# Patient Record
(demographics unavailable — no encounter records)

---

## 2019-10-10 NOTE — DIAGNOSTIC IMAGING REPORT
Abdomen, 1 view.





History: Kidney stones.



Comparison: 4/25/2019.



Findings: Air is scattered throughout nondilated small and large bowel. There

are no masses. The previously seen small calcification projected over the right

renal lower pole is not seen on today's exam. The osseous structures are

intact.





IMPRESSION:

Non-specific bowel gas pattern.



Signed by: Andrey Barone on 10/10/2019 10:10 AM

## 2020-05-01 NOTE — DIAGNOSTIC IMAGING REPORT
EXAM: Renal Ultrasound

INDICATION:        

^[id number]

^0823

^NEOPLASM OF UNCERTAIN OF UNSPC'D KIDNEY  

COMPARISON: CT abdomen and pelvis of 3/11/2019 

TECHNIQUE: Transverse and longitudinal images of the kidneys and bladder were

obtained.  



FINDINGS:     



Right Kidney: 

Length: 12.7 cm

Appearance: Normal echogenicity.  

Collecting system: No hydronephrosis

Stones: None

Cyst/Mass: None



Left Kidney: 

Length: 12.3 cm

Appearance: Normal echogenicity.  

Collecting system: No hydronephrosis

Stones: None

Cyst/Mass: Upper pole 1.4 cm anechoic simple cyst.



Bladder: 

No mass or calculi. Bilateral ureteral jets visualized. Prevoid volume estimate

of 348 cc.



Prostate volume estimate of 40.2 cc.



IMPRESSION:

Left upper pole simple cyst.



No hydronephrosis or renal calculi.



Signed by: Braden Ferrera MD on 5/1/2020 10:08 AM

## 2020-08-18 NOTE — XMS REPORT
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    

                             1,000 mL, 

Rate: 75 ml/hr, Infuse over: 13.3 hr, Route: IV, Dosing Weight 88.636 kg, Total 
Volume: 1,000, Start date: 18 18:26:00 CDT, Duration: 30 day, Stop date: 
18 18:25:00 CDT, 2.04, m2                                         [facility]

 

     Amarayl Amarayl           Yes            2         Daily           [facility]

 

     Avodart Avodart           Yes            5         Daily           [facility]

 

                          Fenofibrate Nanocrystallized (Fenofibrate) 145 Mg Tabl

et Fenofibrate 

Nanocrystallized (Fenofibrate) 145 Mg Tablet             Yes               145  

       Daily             [facility]

 

                          Lansoprazole (Prevacid Solutab) 30 Mg Tabdp Lansoprazo

le (Prevacid Solutab) 30 

Mg Tabdp             Yes               30          Daily             [facility]

 

     Lisinopril Lisinopril           Yes            5         Daily           [facility]

 

       Metformin Hcl 500 Mg Tablet Metformin Hcl 500 Mg Tablet               Yes

                  500           Twice 

A Day                                                       [facility]

 

       Minocycline Hcl 50 Mg Capsule Minocycline Hcl 50 Mg Capsule              

 Yes                  100           

Daily                                                       [facility]

 

     Neurontin Neurontin           Yes            600       Daily           [facility]

 

                          Solifenacin Succinate (Vesicare) 5 Mg Tablet Solifenac

in Succinate (Vesicare) 5 

Mg Tablet             Yes               5           Daily             [facility]

 

     Synthroid Synthroid           Yes            125       Daily           [facility]

 

        Tamsulosin Hcl 0.4 Mg Cap.er.24h Tamsulosin Hcl 0.4 Mg Cap.er.24h       

          Yes                     .4

                          Daily                                  [facility]

 

     Levitra , Levitra ,      2015 00:00:00 No                       Daily

           [facility]

 

     Metformin , Metformin ,      2015 00:00:00 No                       D

aily           [facility]

 

     Tricor , Tricor ,      2015 00:00:00 No                       Daily  

         [facility]







Vital Signs





             Vital Name   Observation Time Observation Value Comments     Source

 

             Systolic (mm Hg) 2020 19:21:00                           [facility]

 

             Diastolic (mm Hg) 2020 19:21:00                           [facility]

[facility] [place]

 

             Heart Rate   2020 19:21:00                           [facility]

 

             Temperature Oral (F) 2020 19:21:00 98.1 F                    

[facility]

 

             Height       2020 19:21:00 165.1 cm                  [facility]

 

             Weight       2020 19:21:00                           [facility]

 

             BMI Calculated 2020 19:21:00                           [facility]

 

             Systolic (mm Hg) 2019 17:05:00                           [facility] [place]

 

             Diastolic (mm Hg) 2019 17:05:00                           [facility]

[facility]

 

             Heart Rate   2019 17:05:00                           [facility]

 

             Temperature Oral (F) 2019 17:05:00 98.0 F                    

[facility] [place]

 

             Height       2019 17:05:00 165.1 cm                  [facility]

 [place]

 

             Weight       2019 17:05:00                           [facility]

 [place]

 

             BMI Calculated 2019 17:05:00                           [facility]

[facility]

 

             Systolic blood pressure 2019 14:52:00 170 mm[Hg]             

   [facility]

 

             Diastolic blood pressure 2019 14:52:00 84 mm[Hg]             

    [facility]

 

             Heart rate   2019 14:52:00 70 /min                   [facility] 

[facility]

 

             Respiratory rate 2019 14:52:00 16 /min                   [facility]

 

                    Oxygen saturation in Arterial blood by Pulse oximetry  14:52:00 97 

/min                                                [facility]

 

             Body temperature 2019 13:35:00 36.39 Miranda                 [facility]

 

             Body height  2019 12:00:00 165.1 cm                  [facility]

 

             Body weight  2019 12:00:00 81.149 kg                 [facility]

 

             BMI          2019 12:00:00 29.77 kg/m2               [facility] 

[facility]

 

             Weight       2018-03-15 14:00:00                           [facility]

 

             Systolic (mm Hg) 2018-03-15 13:00:00                           [facility]

[facility]

 

             Diastolic (mm Hg) 2018-03-15 13:00:00                           [facility]

[facility]

 

             Temperature Oral (F) 2018-03-15 13:00:00 98.2 F                    

[facility]

 

             Heart Rate   2018-03-15 13:00:00                           [facility]

 [place]

 

             Respitory Rate 2018-03-15 13:00:00                           [facility]

[facility] [place]

 

             Heart Rate   2018-03-15 08:50:00                           [facility]

 

             Respitory Rate 2018-03-15 08:50:00                           [facility]

[facility]

 

             Systolic (mm Hg) 2018-03-15 08:50:00                           [facility]

[facility]

 

             Diastolic (mm Hg) 2018-03-15 08:50:00                           [facility]

[facility] [place]

 

             Temperature Oral (F) 2018-03-15 08:50:00 98.0 F                    

[facility]

 

             Temperature Oral (F) 2018-03-15 03:47:00 97.6 F                    

[facility]

 

             Respitory Rate 2018-03-15 03:47:00                           [facility]

[facility] [place]

 

             Heart Rate   2018-03-15 03:47:00                           [facility]

 [place]

 

             Systolic (mm Hg) 2018-03-15 03:47:00                           [facility]

 

             Diastolic (mm Hg) 2018-03-15 03:47:00                           [facility]

[facility] [place]

 

             BMI Calculated 2018-03-15 03:40:00                           [facility]

[facility]

 

             Weight       2018-03-15 03:40:00                           [facility]

 [place]

 

             Height       2018-03-15 03:40:00 165.1 cm                  [facility]

 [place]

 

             Weight       2018 19:35:00                           [facility]

 [facility]

 

             BMI Calculated 2018 19:35:00                           [facility]

[facility]

 

             Height       2018 19:35:00 165.1 cm                  [facility]







Procedures





                Procedure       Date / Time Performed Performing Clinician [facility]

michelle

 

                    EXTRACORPOREAL SHOCKWAVE LITHOTRIPSY (ESWL) 2019 12:47

:00 Mandeep White

 

                POC GLUCOSE     2019 12:08:00 Mandeep White

[facility]

 

                XR ABDOMEN 1 VW 2019 11:41:25 Mandeep White

[facility]

 

                ECG PRE/POST OP 2019 10:47:18 Mandeep White

[facility]

 

                CBC HEMOGRAM    2019 10:44:00 Mandeep White

ethlilian

 

                COMPREHENSIVE METABOLIC PANEL 2019 10:44:00 Dread White

 

                ESTIMATED GFR   2019 10:44:00 Mandeep White

[facility]

 

                    Computed tomography of brain without radiopaque contrast 201

14 00:00:00 

ANDREY OTNIVEROS                        [facility]

 

                CT maxillofacial area wo contrast 2018 00:00:00 SANCHEZ ONTIVEROS [facility]

 

                    Computed tomography of cervical spine without contrast  00:00:00 

ANDREY ONTIVEROS                        [facility]







Plan of Care





             Planned Activity Planned Date Details      Comments     Source

 

                    Future Scheduled Test 2020 00:00:00 INFLUENZA VACCINE 

(#1) [code = 

INFLUENZA VACCINE (#1)]                             [facility]

 

                    Future Scheduled Test 2020 00:00:00 INFLUENZA VACCINE 

[code = INFLUENZA 

VACCINE]                                            [facility] Scheduled Test 2012-11-10 00:00:00 PNEUMOCOCCAL 65+ L

OW/MEDIUM RISK (1 of

2 - PCV13) [code = PNEUMOCOCCAL 65+ LOW/MEDIUM RISK (1 of 2 - PCV13)]           

                [facility]

 

                    Future Scheduled Test 2012-11-10 00:00:00 65+ PNEUMOCOCCAL V

ACCINE (2 of 2 - 

PPSV23) [code = 65+ PNEUMOCOCCAL VACCINE (2 of 2 - PPSV23)]                     

      [facility] Scheduled Test 1997-11-10 00:00:00 COLONOSCOPY SCREEN

ING [code = 

COLONOSCOPY SCREENING]                              [facility] Scheduled Test 1997-11-10 00:00:00 SHINGLES VACCINES 

(#1) [code = 

SHINGLES VACCINES (#1)]                             [facility] Scheduled Test 1947 00:00:00 Screening for jed

gnant neoplasm of 

colon (procedure) [code = 444786187]                           [facility]







Encounters





             Start Date/Time End Date/Time Encounter Type Admission Type Attendi

[facility]   Care Department Encounter ID    Source

 

        2020 14:33:56         Outpatient                 [facility]    [id number]

[id number]    [facility]

 

           2020 08:30:00 2020 08:30:00 Outpatient            Warren Chandrakami [facility]                [id number]         

 

           2020 13:00:00 2020 23:59:59 Outpatient            Warren Chandrakami [facility]                [id number]         

 

           2020 05:58:00 2020 23:59:00 Outpatient            Warren Chandra [facility]                [id number]         

 

        2020-01-10 14:07:48 2020 23:59:59 Outpatient                 [facility]    [id number]  

 

           2019 09:45:00 2019 23:59:59 Outpatient            Warren Chandrakami [facility]                [id number]         

 

        2018 10:04:00 2018 23:59:59 Outpatient                 ANDIE HODGE 

[id number]                             

 

           2018 11:02:00 2018 23:59:00 Outpatient            Myles Pelletier [facility]              [id number]         

 

           2018 12:00:00 2018 12:00:00 Outpatient            [facility] 

[facility] 

[facility]           [facility]           [id number]         

 

          2018 14:34:00 2018-03-15 10:25:00 Outpatient           Myles Murphy [facility]                     [id number]               

 

             2018 09:31:00 2018 13:37:00 Departed Emergency Room ER 

          LOVELY DEY           [facility]          [id number]    [facility]

 

           2018 09:17:00 2018 09:17:00 Registered Clinic [facility]              [id number]        [facility]

 

           2017 08:29:00 2017 08:29:00 Registered Clinic [facility]              [id number]        [facility]

 

           2017 08:14:00 2017 08:14:00 Registered Clinic [facility]              [id number]        [facility]

 

        2017 07:54:00 2017 07:54:00 Registered Clinic               

  [facility]  

[id number]                            [facility]







Results





           Test Description Test Time  Test Comments Results    Result Comments 

Source

 

                CHEST SINGLE (PORTABLE) 2020 16:12:00                     

                              

                                                   [place] [address]      Patient Name: SELVIN PATEL                                MR 
#: [id number]                  : 1947                                  
Age/Sex: 72/M  Acct #: [id number]                              Req #: [id number]
[id number]  Adm Physician:                                                      
Ordered by: Romulo Soriano MD                         Report #: [id number]  
     Location: ER                                      Room/Bed:                
  
________________________________________________________________________________

___________________    Procedure: [id number] DX/CHEST SINGLE (PORTABLE)  Exam 
Date:                             Exam Time:                                    
          REPORT STATUS: Signed    EXAMINATION:  CHEST SINGLE (PORTABLE)        
 INDICATION: Fever      COMPARISON: Chest radiograph 3/5/2019           
FINDINGS:      LINES/TUBES:None      LUNGS:The lungs are well-inflated. No focal
consolidation or pulmonary edema.      PLEURA:No pleural effusion or pneumo
thorax.      MEDIASTINUM:The cardiomediastinal silhouette appears normal in size
and shape.      BONES/SOFT TISSUES:No acute osseous injury. Right proximal 
humerus anchors.      ABDOMEN:No free air under the diaphragm.         
IMPRESSION:    No focal pneumonia or pulmonary edema.      Signed by: Mg Billingsley MD on 2020 4:13 PM        Dictated By: MG BILLINGSLEY MD  Electronically 
Signed By: MG BILLINGSLEY MD on 20  Transcribed By: VALDEMAR on 20 
1613       COPY TO:   ROMULO SORIANO MD                                     

 

                 RENAL RETROPERITONEAL COMP 2020 10:06:00               

                              

                                                         [place] [address]      Patient Name: SELVIN PATEL                          
     MR #: [id number]                  : 1947                          
        Age/Sex: 72/M  Acct #: [id number]                              Req #: 
[id number]  Adm Physician:                                                      
Ordered by: MANDEEP WHITE MD                         Report #: [id number]      
 Location:                                       Room/Bed:                   
________________________________________________________________________________

___________________    Procedure: [id number] US/US RENAL RETROPERITONEAL COMP  
Exam Date: 20                            Exam Time: 823                  
                           REPORT STATUS: Signed    EXAM: Renal Ultrasound   
INDICATION:            2020    NEOPLASM OF UNCERTAIN OF UNSPC'D 
KIDNEY     COMPARISON: CT abdomen and pelvis of 3/11/2019    TECHNIQUE: 
Transverse and longitudinal images of the kidneys and bladder were   obtained.  
     FINDINGS:           Right Kidney:    Length: 12.7 cm   Appearance: Normal 
echogenicity.     Collecting system: No hydronephrosis   Stones: None   
Cyst/Mass: None      Left Kidney:    Length: 12.3 cm   Appearance: Normal 
echogenicity.     Collecting system: No hydronephrosis   Stones: None   
Cyst/Mass: Upper pole 1.4 cm anechoic simple cyst.      Bladder:    No mass or 
calculi. Bilateral ureteral jets visualized. Prevoid volume estimate   of 348 
cc.      Prostate volume estimate of 40.2 cc.      IMPRESSION:   Left upper pole
simple cyst.      No hydronephrosis or renal calculi.      Signed by: Mg Billingsley MD on 2020 10:08 AM        Dictated By: MG BILLINGSLEY MD  Electronically 
Signed By: MG BILLINGSLEY MD on 20 1008  Transcribed By: VALDEMAR on 20 
1008       COPY TO:   MANDEEP WHITE MD                                     

 

           CHEM PANEL 2020 14:19:00            0.9                   [facility]

 

           CHEM PANEL 2020 14:19:00            85                    [facility]

 

                ABDOMEN-1VIEW (KUB) 2019-10-10 10:08:00                         

                              

                                               [place] [address]
     Patient Name: SELVIN PATEL                                   MR #: 
[id number]                     : 1947                                  
Age/Sex: 71/M  Acct #: [id number]                              Req #: [id number]
[id number]  Adm Physician:                                                      
Ordered by: MANDEEP WHITE MD                            Report #: [id number]   
    Location: [facility]                                     Room/Bed:                 
   
________________________________________________________________________________

___________________    Procedure: [id number] DX/ABDOMEN-1VIEW (KUB)  Exam Date: 
10/10/19                            Exam Time: 0910                             
                REPORT STATUS: Signed       Abdomen, 1 view.         History: 
Kidney stones.      Comparison: 2019.      Findings: Air is scattered 
throughout nondilated small and large bowel. There   are no masses. The 
previously seen small calcification projected over the right   renal lower pole 
is not seen on today's exam. The osseous structures are   intact.         
IMPRESSION:   Non-specific bowel gas pattern.      Signed by: Andrey Barone on 
10/10/2019 10:10 AM        Dictated By: ANDREY BARONE MD  Electronically Signed 
By: ANDREY BARONE MD on 10/10/19 1010  Transcribed By: VALDEMAR on 10/10/19 1010 
     COPY TO:   MANDEEP WHITE MD                                     

 

                    POC glucose         2019 12:15:21   

 

                                        Test Item

 

             POC glucose (test code = 62694-0) 110 mg/dL    65-99        H      

      Meter ID: 

[id number]: Radha Alaina 

 

             Lab Interpretation (test code = 93552-9) Abnormal                  

              





[facility] [id number] 11:50:22Hm Interface, Radiology 
Results  - 2019 11:53 AM CDTEXAMINATION:  XR ABDOMEN 1 VWCLINICAL 
HISTORY:  KUB for renal stone location preopCOMPARISON:  2019IMPRESSION:A 
couple of faint 3 mm calculi overlie the right lower renal pole.The bowel gas 
pattern is unremarkable.There is no significant skeletal abn
ormality.[id number] MethodistECG Pre/Post [id number] 12:12:30* 



             Test Item    Value        Reference Range Interpretation Comments

 

             Ventricular rate (test code = 253) 69                              

        

 

             Atrial rate (test code = 255) 69                                   

   

 

             VA interval (test code = 266) 172                                  

   

 

             QRSD interval (test code = 260) 110                                

     

 

             QT interval (test code = 264) 396                                  

   

 

             QTC interval (test code = 265) 424                                 

    

 

             P axis 1 (test code = 267) 10                                      

 

             QRS axis 1 (test code = 268) 42                                    

  

 

             T wave axis (test code = 270) 34                                   

   

 

                          EKG impression (test code = 273) Normal sinus rhythm-L

ow voltage QRS-Incomplete 

right bundle branch block-Borderline ECG-In automated comparison with ECG of 
2019 09:24,-No significant change was found-Electronically Signed By Carlos Vincent MD ([id number]) on 2019 12:12:27 PM                                   

       





[facility]Com[facility] metabolic [id number] 11:23:51* 



             Test Item    Value        Reference Range Interpretation Comments

 

             Sodium (test code = 2951-2) 138          135- 148 mEq/L            

   

 

             Potassium (test code = 2823-3) 4.2          3.5- 5.0 mEq/L         

      

 

             Chloride (test code = 5-0) 101          98- 112 mEq/L           

    

 

             CO2 (test code = -9) 27           24- 31 mEq/L               

 

             Anion gap (test code = 33037-3) 10@ANIO      7- 15 mEq/L           

     

 

             BUN (test code = 3094-0) 13 mg/dL     8-23                       

 

             Creatinine (test code = 2160-0) 1.00 mg/dL   0.7-1.2               

     

 

             Glucose (test code = 2345-7) 138 mg/dL    65-99        H           

  

 

             Calcium (test code = 46748-8) 9.7 mg/dL    8.8-10.2                

   

 

             Protein (test code = 2885-2) 7.4 g/dL     6.3-8.3                  

 [place]               4.6-7.0 

g/dL1 week               4.4-7.6 g/dL7 months-1year          5.1-7.3 g/dL1-2 
years          5.6-7.5 g/dL>3 years          6.0-8.0 g/dY69-431               
6.3-8.3 g/dL 

 

             Albumin (test code = 1751-7) 4.5 g/dL     3.5-5                    

  

 

             A/G ratio (test code = 1759-0) 1.6          0.7-3.8                

    

 

             Alkaline phosphatase (test code = 6768-6) 32 U/L             

 L             

 

             AST (test code = 1920-8) 19 U/L       10-50                      

 

             ALT (test code = 1742-6) 23 U/L       5-50                       

 

             Total bilirubin (test code = -2) <0.2         0-1.2            

          

 

             Lab Interpretation (test code = 97059-4) Abnormal                  

              





[facility]ammad MethodistEstimated [id number] 11:23:51* 



             Test Item    Value        Reference Range Interpretation Comments

 

             Estimated GFR (test code = 5488) 75           mL/min/1.73 m2       

       Catergory  Units    

InterpretationG1         >=90     Normal or highG2         60-89    Mildly 
hccmbbwimN9w        45-59    Mildly to moderately foeisxlyyR9u        30-44    
Moderately to severely decreasedG4         15-29    Severely decreasedG5        
<15      Kidney failureThe eGFR was calculated using the Chronic Kidney Disease 
Epidemiology Collaboration (CKD-EPI) equation. Interpretation is based on 
recommendations of the National Kidney Foundation-Kidney Disease Outcomes 
Quality Initiative (NKF-KDOQI) published in 2014. 





[facility] [id number] 11:16:08* 



             Test Item    Value        Reference Range Interpretation Comments

 

             WBC (test code = 36757-1) 5.44         4.50- 11.00 k/uL            

   

 

             RBC (test code = 20177-1) 5.36 m/uL    4.4-6                      

 

             HGB (test code = 718-7) 13.7 g/dL    14-18        L             

 

             HCT (test code = 4544-3) 44.3 %       41-51                      

 

             MCV (test code = 787-2) 82.6 fL                           

 

             MCH (test code = 785-6) 25.6 pg      27-34        L             

 

             MCHC (test code = 786-4) 30.9 g/dL    31-37        L             

 

             RDW - SD (test code = 12574-0) 43.8 fL      37-55                  

    

 

             MPV (test code = 19414-3) 10.6 fL      8.8-13.2                   

 

             Platelet count (test code = 96194-7) 213          150- 400 k/uL    

           

 

             Nucleated RBC (test code = 79371-2) 0.00         /100 WBC          

         

 

             Lab Interpretation (test code = 19924-1) Abnormal                  

              





[facility] [id number] 12:50:00* 



             Test Item    Value        Reference Range Interpretation Comments

 

             LACTIC ACID POC (test code = LACTP) 2.20 mmol/L  0.7-2.0      HH   

         





[id number] 11:01:00* 



             Test Item    Value        Reference Range Interpretation Comments

 

             POTASSIUM (test code = K) 5.0 mmol/L   3.4-5.0      N             





HGB   [id number] 10:53:00* 



             Test Item    Value        Reference Range Interpretation Comments

 

             HEMOGLOBIN (test code = HGB) 11.8 g/dL    14.0-18.0    L           

  

 

             HEMATOCRIT (test code = HCT) 37.1 %       37.0-49.0    N           

  





- CT ABD PELVIS [id number] 10:12:00 FAX:        Beulah Walton MD       
[phone]    [place]:   St: REG----------
---------------------------------------------------------------------  Name:  SELVIN SIBLEY                     [facility]                     : 11/10/194
7   Age/S: 71/M           [address]                  Unit: [id number]      
Loc: [place] [address]                Phys:  Beulah Walton MD     
                                              Acct:  [id number] Dis Date:     
         Status: REG ER                                  PHONE #: [phone]  
  Exam Date: 2019 1000                        FAX #: [phone]     
Reason: llq pain                                            EXAMS:              
                                CPT CODE:      [id number] CT ABD PELVIS W/CONT   
                   [address]                    EXAM:  - CT ABD PELVIS W/CONT       
       LOCATION: C3               INDICATION: 71 years -old Male with llq pain  
            TECHNIQUE: Contrast -        IV contrast was given. No oral contrast
was given        Portal venous phase - abdomen and pelvis       Delayed images 
through the abdomen.       Reconstructions - coronal and sagittal planes        
      This exam was performed according to our departmental       dose-opti
mization program, which includes automated exposure control,       adjustment of
the mA and/or kV according to patient size and/or use of       iterative recons
truction technique               COMPARISON: None               FINDINGS:       
Statements: None.               Thoracic: Included images of the lower chest dem
onstrate no       abnormalities.               Hepatobiliary: Liver demonstrates
diffuse decrease in attenuation       without focal lesion. The gallbladder is 
normal. No biliary dilation.               Pancreas: Normal.               Splee
n: Normal.                Adrenals: Normal.               Genitourinary: 1.2 cm 
left renal cyst noted.  No hydronephrosis.        Evaluation of the bladder is l
imited, but no obvious bladder       abnormality is present.               Gastr
ointestinal: Mild inflammation identified at the proximal aspect       of the si
gmoid in this patient with diverticula. The appendix is not       visualized.   
           Vascular: Atherosclerotic calcifications are seen within the aorta a
nd       branch vessels.      PAGE  1                       Signed Report       
            (CONTINUED)  FAX:        Beulah Walton MD       [phone]    [place]
us:   St: REG-----------------------------------------------------------------
--------------  Name:  SELVIN PATEL                     [facility]          
          : 1947   Age/S: 71/M           [address]                
 Unit: [id number]       [place]: [place] [address]                
hys:  Beulah Walton MD                                                    Acct:  
[id number] Dis Date:               Status: REG ER                             
    PHONE #: [phone]     Exam Date: 2019 1000                        
FAX #: [phone]     Reason: llq pain                                        
   EXAMS:                                               CPT CODE:      [id number]
CT ABD PELVIS W/CONT                       [address]               <Continued>      
         Lymphatics: No enlarged lymph nodes by CT size criteria.               
Bones/Soft Tissues: No acute osseous findings. No ventral hernias.              
Peritoneum/Other: No extraluminal air. No extraluminal fluid.                 
IMPRESSION:                   Uncomplicated mild sigmoid diverticulitis.        
          Hepatic steatosis.          ** Electronically Signed by Yuval Patterson MD on 2019 at 1012 **                      Reported and signed by: Yuval Patterson MD                         CC: Beulah Walton MD                           
                                                                                
             Technologist: MANSOOR Gaines                          
 Trnscrd Dt/Tm: 2019 (1012) t.SDR.HV2                          Orig Print 
D/T: S: 2019 (1015                              PAGE  2                   
   Signed Report                               BASIC METABOLIC [id number] 
09:57:00* 



             Test Item    Value        Reference Range Interpretation Comments

 

             SODIUM (test code = NA) 134 mmol/L   137-145      L             

 

             POTASSIUM (test code = K) 5.6 mmol/L   3.4-5.0      H            IS

 THE SAMPLE 

HEMOLYZED?:NHEMOLYSIS GRADE:

 

             CHLORIDE (test code = CL) 98 mmol/L           N             

 

             CARBON DIOXIDE (test code = CO2) 22 mmol/L    22-30        N       

      

 

             GLUCOSE (test code = GLU) 331 mg/dL           H             

 

             BLOOD UREA NITROGEN (test code = BUN) 26 mg/dL     9-20         H  

           

 

             GLOMERULAR FILTRATION RATE (test code = GFR) 63           >60      

                 The estimated glomerular 

filtration rate is computed usingpatient race, age (>18), sex, and serum 
creatinine. If anyof the needed data elements are missing the Laboratory cannot 
compute an estimation of the glomerular filtration rate.

 

             CREATININE (test code = CREAT) 1.2 mg/dL    0.7-1.3      N         

    

 

             CALCIUM (test code = CA) 8.8 mg/dL    8.4-10.2     N             





LIVER FUNCTION [id number] 09:57:00* 



             Test Item    Value        Reference Range Interpretation Comments

 

             TOTAL PROTEIN (test code = PROT) 6.8 g/dL     6.3-8.2      N       

      

 

             ALBUMIN (test code = ALB) 4.1 g/dL     3.5-5.0      N             

 

             BILIRUBIN TOTAL (test code = BILT) 0.3 mg/dL    0.2-1.3      N     

        

 

             BILIRUBIN CONJUGATED (test code = BILCON) 0 mg/dL      0-0.3       

 N            

~~~~~~~~~~~~~~~~~~~~~~~~~~~~~~~~~~~~~~~~~~~~~~~~~~~~~~~~~~~~CONJUGATED BILIRUBIN
IS THE REPLACEMENT ASSAY FOR 
DIRECTBILIRUBIN.~~~~~~~~~~~~~~~~~~~~~~~~~~~~~~~~~~~~~~~~~~~~~~~~~~~~~~~~~~~~

 

             BILIRUBIN UNCONJUGATED (test code = BILUNC) 0.2 mg/dL    0-1.1     

   N             

 

             SGOT/AST (test code = AST) 17 U/L       15-46        N             

 

             SGPT/ALT (test code = ALT) 19 U/L       13-69        N             

 

             ALKALINE PHOSPHATASE (test code = ALKP) 38 U/L              N

             





[id number] 09:57:00* 



             Test Item    Value        Reference Range Interpretation Comments

 

             LIPASE (test code = LIP) 255 U/L             N             





BEDSIDE [id number] 09:53:00* 



             Test Item    Value        Reference Range Interpretation Comments

 

             BEDSIDE CREATININE (test code = CREATBED) 1.2 mg/dL    0.66-1.25   

 N             





PROTHROMBIN [id number] 09:52:00* 



             Test Item    Value        Reference Range Interpretation Comments

 

             PROTHROMBIN TIME PATIENT (test code = PTP) 12.2 SECONDS 9.2-12.1   

  H             

 

             INTERNATIONAL NORMAL RATIO (test code = INR) 1.1                   

                 The INR is to be used only 

for monitoring ORAL ANTICOAGULANTTHERAPY.         Indication                    
            INR Value1.  Prophylaxis/treatment of:                            
Venous Thrombosis, Pulmonary Embolism        2.0 - 3.02.  Prevention of systemic
embolism from:      Tissue heart valves                          2.0 - 3.0      
Acute myocardial infarction (to present      systemic embolism)*                
         2.0 - 3.0      Valvular heart disease                       2.0 - 3.0  
   Atrial fibrillation                          2.0 - 3.03.  Mechanical 
prosthetic valves (high risk)       2.5 - 3.5 * If oral anticoagulant therapy is
elected to preventrecurrent myocardial infarction, an INR of 2.5-3.5 
isrecommended, consistent with Food and Drug Administrationrecommendations.





THROMBOPLASTIN TIME [id number] 09:52:00* 



             Test Item    Value        Reference Range Interpretation Comments

 

             THROMBOPLASTIN TIME PARTIAL (test code = PTT) 22.4 SECONDS 23.4-37.

0    L              

Therapeutic Range for Heparin EFFECTIVE 7/10/13 Heparin IU/mL                   
aPTT Seconds0.3                              64.30.7                            
 88.8





CBC W/AUTO [id number] 09:44:00* 



             Test Item    Value        Reference Range Interpretation Comments

 

             WHITE BLOOD CELL (test code = WBC) 9.5 x10 3/uL 5.0-12.0     N     

        

 

             RED BLOOD CELL (test code = RBC) 5.09 x10 6/uL 4.70-6.10    N      

       

 

             HEMOGLOBIN (test code = HGB) 12.4 g/dL    14.0-18.0    L           

  

 

             HEMATOCRIT (test code = HCT) 40.0 %       37.0-49.0    N           

  

 

             MEAN CELL VOLUME (test code = MCV) 79 fL        80-94        L     

        

 

             MEAN CELL HGB (test code = MCH) 24.4 pg      27-31        L        

     

 

             MEAN CELL HGB CONCENTRATION (test code = MCHC) 31.0 g/dL    33-37  

      L             

 

             RED CELL DISTRIBUTION WIDTH (test code = RDW) 14.8 %       11.5-15.

5    N             

 

             PLATELET COUNT (test code = PLT) 288 x10 3/uL 130-400      N       

      

 

             MEAN PLATELET VOLUME (test code = MPV) 10.4 fL      9.4-16.4     N 

            

 

             NEUTROPHIL % (test code = NT%) 73.4 %       43-65        H         

    

 

             IMMATURE GRANULOCYTE % (test code = IG%) 0.6 %        0.0-2.0      

N             

 

             LYMPHOCYTE % (test code = LY%) 19.3 %       20.5-45.5    L         

    

 

             MONOCYTE % (test code = MO%) 4.7 %        5.5-11.7     L           

  

 

             EOSINOPHIL % (test code = EO%) 1.8 %        0.9-2.9      N         

    

 

             BASOPHIL % (test code = BA%) 0.2 %        0.2-1.0      N           

  

 

             NUCLEATED RBC % (test code = NRBC%) 0.0 %        0-1.0        N    

         

 

             NEUTROPHIL # (test code = NT#) 6.96 x10 3/uL 2.2-4.8      H        

     

 

             IMMATURE GRANULOCYTE # (test code = IG#) 0.06 x10 3/uL 0-0.03      

 H             

 

             LYMPHOCYTE # (test code = LY#) 1.83 x10 3/uL 1.3-2.9      N        

     

 

             MONOCYTE # (test code = MO#) 0.45 x10 3/uL 0.3-0.8      N          

   

 

             EOSINOPHIL # (test code = EO#) 0.17 x10 3/uL 0.0-0.2      N        

     

 

             BASOPHIL # (test code = BA#) 0.02 x10 3/uL 0.0-0.1      N          

   





ABDOMEN-1VIEW (KUB)2019 09:21:00                                          
                                           [place] [address]    
 Patient Name: SELVIN PATEL                                   MR #: 
[id number]                     : 1947                                  
Age/Sex: 71/M  Acct #: [id number]                              Req #: [id number]
[id number]  Adm Physician:                                                      
Ordered by: MANDEEP WHITE MD                            Report #: [id number]   
    Location: [facility]                                     Room/Bed:                 
   ______________________________________________
_____________________________________________________    Procedure: [id number] DX
/ABDOMEN-1VIEW (KUB)  Exam Date: 19                            Exam Time: 
0850                                              REPORT STATUS: Signed    Exam:
KUB - 2 views      Clinical History: Renal calculus.      Comparison: CT abdome
n/pelvis 3/11/2019.      Findings:    There is a 5 mm calcification overlying th
e right lower pole kidney. No   evidence of calcification overlying the left kid
karla or expected course of the   ureters. There are prostatic calcifications.    
 Nonobstructive bowel gas pattern. Moderate amount of stool in the colon.      
No acute bony abnormality.      Impression:   A 5 mm calcification overlying the
right lower pole kidney, corresponding to   stone seen on prior CT from 3/11/20
19.      Signed by: Dr. Ruma Méndez MD on 2019 9:25 AM        Dictated By: 
RUMA MÉNDEZ MD  Electronically Signed By: RUMA MÉNDEZ MD on 19  Transcr
ibed By: VALDEMAR on 19       COPY TO:   MANDEEP WHITE MD         CT 
ABDOMEN/PELVIS [id number] 10:12:00                                           
                                          [place] [address]     
Patient Name: SELVIN PATEL                                   MR #: [id number]
                    : 1947                                   Age/Sex: 
71/M  Acct #: [id number]                              Req #: [id number]  Adm 
Physician:                                                      Ordered by: 
MANDEEP WHITE MD                            Report #: [id number]        
Location: CT                                      Room/Bed:                     
______________________________________________
_____________________________________________________    Procedure: [id number] CT
/CT ABDOMEN/PELVIS WOW  Exam Date: 19                            Exam Time
: 0850                                              REPORT STATUS: Signed    EXA
M: CT Abdomen and Pelvis WITHOUT and WITH contrast     INDICATION: Renal neoplas
m   COMPARISON: CT abdomen/pelvis, 2019   TECHNIQUE:  Abdomen and pelvis we
re scanned utilizing a multidetector helical   scanner from the lung base to the
pubic symphysis before and after   administration of IV contrast. Coronal and s
agittal reformations were obtained.   Imaging was obtained precontrast, arterial
phase, venous phase and delayed   phase. Renal mass protocol was performed, with
abdomen and pelvis scanning on   delayed imaging.      Dose modulation, iterat
jonathan reconstruction, and/or weight based adjustment of   the mA/kV was utilized t
o reduce the radiation dose to as low as reasonably   achievable.               
   IV CONTRAST: 100 mL of Isovue-370               ORAL CONTRAST: 900 cc water  
            RADIATION DOSE: Total DLP: 1803.90 mGy*cm                Estimated 
effective dose: (DLP x 0.015 x size factor) mSv               COMPLICATIONS: No
ne      FINDINGS:      LINES and TUBES: None.      LOWER THORAX:  Lung bases collette
ar. Heart size normal.      HEPATOBILIARY: Diffuse low density hepatic parenchym
a compatible with   steatosis.  No focal hepatic lesions. No biliary ductal dila
tion.       GALLBLADDER: No radio-opaque stones or sludge.  No wall thickening. 
    SPLEEN: No splenomegaly.       PANCREAS: No focal masses or ductal dilatati
on.        ADRENALS: No adrenal nodules          KIDNEYS/URETERS: Kidneys enhanc
e symmetrically.  No hydronephrosis or ureteral   dilatation no filling defects 
in opacified collecting systems. There is a well   marginated 1.1 cm peripheral 
low density mass in the lateral upper aspect of   the left kidney. This is best 
seen on postcontrast imaging. The internal   density measures under 10 HU on eac
h phase of the examination with no evidence   for enhancement.  Again noted is a
5 mm calcification in the lower pole right   collecting system compatible with 
nonobstructing intrarenal calculus. There is   very mild perinephric stranding b
ilaterally.      GI TRACT: No abnormal distention, wall thickening, or evidence 
of bowel   obstruction.  There are scattered colonic diverticula with no CT evid
ence for   acute diverticulitis.  The appendix is not conspicuously visualized b
ut there   is no right lower quadrant inflammation to suggest appendicitis.     
PELVIC ORGANS/BLADDER: Partially opacified urinary bladder unremarkable. No   d
iscrete abnormal mass or fluid collection in the pelvis.      LYMPH NODES: No do
minant lymph node mass is seen in the abdomen,   retroperitoneum or pelvis.     
VESSELS: There are scattered calcified plaques along the abdominal aorta. No   
abdominal aortic aneurysm or dissection. Renal arteries are patent with an   acc
essory left renal artery noted.  IVC unremarkable. Portal system is patent,   on
ly partially included on the venous phase which is targeted at the kidneys.     
PERITONEUM / RETROPERITONEUM: No pneumoperitoneum or ascites.      BONES: No ac
Apache or suspicious bony lesions.      SOFT TISSUES: Superficial surrounding soft 
tissue unremarkable.   Small left   inguinal hernia containing fat.            I
MPRESSION:    1.  1.1 cm hypodensity in the lateral upper pole of the left kidne
y is cyst   density, well marginated and shows no evidence for enhancement. This
is   compatible with a small cortical cyst.      2.  Again noted is a 5 mm nono
bstructing intrarenal calculus the lower pole of   the right collecting system. 
    3.  No other acute finding or significant change from previous CT.      Sig
mary by: Dr. Robert Nolasco M.D. on 3/11/2019 10:37 AM        Dictated By: ERICK NOLASCO MD  Electronically Signed By: ROBERT NOLASCO MD on 19 1037  Transcr
ibed By: VALDEMAR on 19 1037       COPY TO:   MANDEEP WHITE MD          
[id number] 17:21:00                                                      
                               [place] [address]      Patient 
Name: SELVIN PATEL                                   MR #: [id number]        
            : 1947                                   Age/Sex: 71/M  
Acct #: [id number]                              Req #: [id number]  Adm 
Physician:                                                      Ordered by: 
KARLEE BANUELOS M.D.                            Report #: [id number]        
Location: [facility]                                      Room/Bed:                     
___________________________________________
________________________________________________________    Procedure: [id number]
US/US THYROID  Exam Date: 19                            Exam Time: 1348   
                                          REPORT STATUS: Signed    EXAM: Thyroid
Ultrasound   INDICATION:         THYROID CANCER. Thyroidectomy .    JATINDER
RISON: None    TECHNIQUE: Transverse and sagittal images were obtained of the th
yroid bed.       FINDINGS:      Thyroid bed:   Status post thyroidectomy. No res
idual thyroid tissue or recurrent thyroid   tissue is noted in the thyroid bed b
ilaterally.        Lymph nodes:   No suspicious appearing lymph nodes.        IM
PRESSION:      Status post thyroidectomy. No evidence to suggest residual or rec
urrent   disease.      Signed by: Dr. Joselito Dunlap M.D. on 3/5/2019 5:22 PM        D
ictated By: JOSELITO DUNLAP MD  Electronically Signed By: JOSELITO DUNLAP MD on 19  T
ranscribed By: VALDEMAR on 19       COPY TO:   KARLEE BANUELOS M.D.   
     CHEST 2 [id number] 15:13:00                                           
                                          [place] [address]     
Patient Name: SELVIN PATEL                                   MR #: [id number]
                    : 1947                                   Age/Sex: 
71/M  Acct #: [id number]                              Req #: [id number]  Adm 
Physician:                                                      Ordered by: 
KARLEE BANUELOS M.D.                            Report #: [id number]        
Location:                                       Room/Bed:                     
___________________________________________
________________________________________________________    Procedure: [id number]
DX/CHEST 2 VIEWS  Exam Date: 19                            Exam Time: 1340
                                             REPORT STATUS: Signed       EXAMI
NATION: PA and lateral views of the chest.      COMPARISON: None      CLINICAL H
ISTORY:  Thyroid cancer           DISCUSSION:      Lines/tubes:  None.      Lung
s:  The lungs are well inflated and clear. No pneumonia or pulmonary edema.     
Pleura:  No pleural effusion or pneumothorax.      Heart and mediastinum:  The 
cardiomediastinal silhouette is normal.      Bones and soft tissues:  No acute b
damari abnormalities.           IMPRESSION:       No acute cardiopulmonary abnormal
ities.                  Signed by: Dr. Andrew Palisch, M.D. on 3/5/2019 3:14 PM 
      Dictated By: ANDREW R PALISCH MD  Electronically Signed By: CHI ÁLVAREZ MD on 19  Transcribed By: VALDEMAR on 19       COPY TO:
  KARLEE BANUELOS M.D.         CT ABDOMEN/PELVIS [id number] 10:30:00          
                                                                           [place] [address]      Patient Name: SELVIN PATEL          
                        MR #: [id number]                     : 1947    
                              Age/Sex: 71/M  Acct #: [id number]               
              Req #: [id number]  Adm Physician:                                 
                    Ordered by: MANDEEP WHITE MD                            
Report #: [id number]        Location: CT                                      
Room/Bed:                     ______________________________________________
_____________________________________________________    Procedure: [id number] CT
/CT ABDOMEN/PELVIS WO  Exam Date: 19                            Exam Time:
910                                              REPORT STATUS: Signed    EXAM:
CT ABDOMEN AND PELVIS without IV CONTRAST   DATE: 2019 Time stamp on Exam: 
9:37 AM   INDICATION:  Hematuria   COMPARISON:  None    TECHNIQUE: The abdomen 
and pelvis were scanned using a multidetector helical   scanner. Coronal and sa
gittal reformations were obtained. Routine protocol   performed.      Low-dose t
echnique was utilized.      IV Contrast: None   Oral Contrast: None   Radiation 
Dose: Total .08 mGy*cm   Estimated effective dose: DLP x 0.015 x size fac
tor      FINDINGS:   LOWER THORAX: No consolidations      LIVER: No masses with 
diffuse fatty infiltration of the liver.   BILIARY: The gallbladder is unremarka
ble.  No ductal dilatation.      SPLEEN: No masses   PANCREAS: No masses      AD
RENALS: No nodules   KIDNEYS: There are 2 small right lower pole renal stones wi
th a composite   measurement of 6 mm. No evidence of hydronephrosis. Contour abn
ormality of the   left upper interpolar region with subtle low density may repre
sent a cyst or a   mass. In a patient with gross hematuria CT renal mass protoco
l follow-up would   be of benefit. Nonspecific bilateral perinephric fat strandi
ng.      GI TRACT: No distention, wall thickening or evidence of obstruction.   
      VESSELS: Vascular calcification.   PERITONEUM/RETROPERITONEUM: No free air
or fluid   LYMPH NODES: No lymphadenopathy      REPRODUCTIVE ORGANS: Prostate 
calcification.   BLADDER: Unremarkable      SOFT TISSUES: Unremarkable   BONES: 
No suspicious bone lesions. Mild degenerative changes of the spine.      IMPRESS
ION:   1. Two small nonobstructing right lower pole renal stones.   2. Subtle le
ft lateral renal interpolar contour abnormality with focal   hypodensity.   3. F
ollow-up renal mass protocol is recommended.   4. Diffuse fatty infiltration of 
the liver.      Signed by: Dr. Jose Maria Meza DO on 2019 10:43 AM        Di
ctated By: JOSE MARIA MEZA DO  Electronically Signed By: JOSE MARIA MEZA DO on  1043  Transcribed By: VALDEMAR on 19 1043       COPY TO:   RAVINDRA WHITE RD, MD         ABDOMEN-1VIEW (KU)2018 09:41:00                            
                                                         [place] [address]      Patient Name: SELVIN PATEL                        
          MR #: [id number]                     : 1947                  
                Age/Sex: 71/M  Acct #: [id number]                             
Req #: [id number]  Adm Physician:                                               
      Ordered by: MANDEEP WHITE MD                            Report #: [id number]
[id number]        Location: [facility]                                     Room/Bed:         
           ______________________________________________
_____________________________________________________    Procedure: [id number] DX
/ABDOMEN-1VIEW (KUB)  Exam Date: 18                            Exam Time: 
0852                                              REPORT STATUS: Signed    PROCE
DURE:   X-RAY ABDOMEN - KUB       COMPARISON:   KUB 18.       INDICATIONS: 
 CALCULUS OF KIDNEY       FINDINGS:      Again noted are right lower pole renal 
stones measuring up to 5 mm. No    evidence of calcifications overlying the left
kidney or the expected    course of bilateral ureters.        There is a non-o
bstructed bowel-gas pattern. There are no acute osseous    abnormalities. The ramos
ng bases are clear.       CONCLUSION:      Similar appearance of right lower gwendolyn
e renal stones measuring up to 5    mm.       Dictated by:  RUMA MÉNDEZ M.D. on 
2018 at 9:41        Electronically approved by:  RUMA MÉNDEZ M.D. on  at 9:41                Dictated By: RUMA MÉNDEZ MD  Electronically Signed B
y: RUMA MÉNDEZ MD on 18  Transcribed By: TIFFANI on 18       
COPY TO:   MANDEEP WHITE MD        ABDOMEN-1VIEW (KUB)2018 12:18:00    [place] [address]      Patient Name: SELVIN PATEL

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
## 2020-08-18 NOTE — DIAGNOSTIC IMAGING REPORT
EXAMINATION:  CHEST SINGLE (PORTABLE)    



INDICATION: Fever



COMPARISON: Chest radiograph 3/5/2019

     

FINDINGS:



LINES/TUBES:None



LUNGS:The lungs are well-inflated. No focal consolidation or pulmonary edema.



PLEURA:No pleural effusion or pneumothorax.



MEDIASTINUM:The cardiomediastinal silhouette appears normal in size and shape.



BONES/SOFT TISSUES:No acute osseous injury. Right proximal humerus anchors.



ABDOMEN:No free air under the diaphragm.





IMPRESSION: 

No focal pneumonia or pulmonary edema.



Signed by: Braden Ferrera MD on 8/18/2020 4:13 PM

## 2020-08-18 NOTE — XMS REPORT
Clinical Summary

                             Created on: 2020



Ziyad Whitt

External Reference #: [id number]

: 1947

Sex: Male



Demographics





                          Address                   [address] [place]  [address]

 

                          Home Phone                [phone]

 

                          Preferred Language        English

 

                          Marital Status            

 

                          Sikhism Affiliation     Unknown

 

                          Race                      White

 

                          Ethnic Group              /Latin





Author





                          Author                    [facility]

 

                          Organization              [facility]

 

                          Address                   Unknown

 

                          Phone                     Unavailable







Support





                Name            Relationship    Address         Phone

 

                Aditi PAREDES            Unknown         [phone]







Care Team Providers





                    Care Team Member Name Role                Phone

 

                    Masood Meza MD PCP                 [phone]

[phone]







Allergies





                                        Comments



                 Active Allergy  Reactions       Severity        Noted Date 

 

                                         



                     Levofloxacin        GI                  2019 



                                         Intolerance   

 

                                         



                     Penicillins         Swelling            2019 







Medications





                          End Date                  Status



              Medication   Sig          Dispensed    Refills      Start  



                                         Date  

 

                                                    Active



                     fenofibrate (TRICOR) 145  Take 145 mg         0   



                           MG tablet                 by mouth     



                                         nightly.     

 

                                                    Active



                     lisinopril          Take 5 mg by        0   



                           (PRINIVIL,ZESTRIL) 5 mg   mouth every     



                           tablet                    evening.     

 

                                                    Active



                     glimepiride (AMARYL) 4 MG  Take 4 mg by        0   



                           tablet                    mouth 2 (two)     



                                         times a day.     

 

                                                    Active



                     TESTOSTERONE CYPIONATE IM  Inject into         0   



                                         the shoulder,     



                                         thigh, or     



                                         buttocks     



                                         every 14     



                                         (fourteen)     



                                         days.     

 

                                                    Active



                     sildenafil citrate  Take by             0   



                           (VIAGRA ORAL)             mouth.     

 

                                                    Active



                     solifenacin succinate  Take by             0   



                           (VESICARE ORAL)           mouth.     

 

                                                    Active



                     lansoprazole (PREVACID)  Take 15 mg by       0   



                           15 MG capsule             mouth daily.     

 

                                                    Active



                     gabapentin (NEURONTIN)  Take 600 mg         0   



                           600 mg tablet             by mouth 3     



                                         (three) times     



                                         a day.     

 

                                                    Active



                     metFORMIN (GLUCOPHAGE)  Take 500 mg         0   



                           500 mg tablet             by mouth     



                                         daily with     



                                         breakfast.     

 

                                                    Active



                     levothyroxine (SYNTHROID,  Take 125 mcg        0   



                           LEVOXYL) 125 mcg tablet   by mouth     



                                         daily.     

 

                                                    Active



                     dutasteride (AVODART) 0.5  Take 0.5 mg         0   



                           mg capsule                by mouth     



                                         daily.     

 

                          2019                Discontinued (Therapy comple

carolnia)



                     tamsulosin (FLOMAX) 0.4  Take 0.4 mg         0   



                           mg capsule                by mouth     



                                         daily with     



                                         dinner.     

 

                          2019                Discontinued (Stop Taking at

 Discharge)



                     aspirin (ECOTRIN) 81 MG  Take 81 mg by       0   



                           enteric coated tablet     mouth daily.     

 

                          2019                Discontinued (Stop Taking at

 Discharge)



                     docosahexanoic acid/epa  Take by             0   



                           (FISH OIL ORAL)           mouth.     







Active Problems





 



                           Problem                   Noted Date

 

 



                           Calculus of kidney        2019







Encounters





                          Care Team                 Description



                     Date                Type                Specialty  

 

                                        



Mandeep Burns MD                  [facility]



                     2019          Surgery             General Surgery  

 

                                        



Sánchez Armendariz MD Strever, Michael Shane, CRNA             



                     2019          Anesthesia          General Surgery  



                                         Event   

 

                                        



Mandeep Burns MD                   



                     2019          Hospital            General Surgery  



                                         Encounter   

 

                                        



Mandeep Burns MD                  Pre-op testing (Primary Dx)



                     2019          Pre-Admit           Pre-Admission Testi

ng  



                                         Testing   



                                         Appointment   



after 2019



Family History





   



                 Medical History  Relation        Name            Comments

 

   



                           Diabetes                  Mother  







   



                 Relation        Name            Status          Comments

 

   



                           Father                     

 

   



                           Mother                     







Social History





                                        Date



                 Tobacco Use     Types           Packs/Day       Years Used 

 

                                         



                 Former Smoker   Cigarettes      0.2             30 

 

    



                                         Smokeless Tobacco: Never   



                                         Used   







                    Drinks/Week         oz/Week             Comments



                                         Alcohol Use   

 

                                                            ex social smoker



                                         Not Currently   







 



                           Sex Assigned at Birth     Date Recorded

 

 



                                         Not on file 







                                        Industry



                           Job Start Date            Occupation 

 

                                        Not on file



                           Not on file               Not on file 







                                        Travel End



                           Travel History            Travel Start 

 





                                         No recent travel history available.







Last Filed Vital Signs





                    Reading             Time Taken          Comments



                                         Vital Sign   

 

                    170/84              2019  2:52 PM CDT  



                                         Blood Pressure   

 

                    70                  2019  2:52 PM CDT  



                                         Pulse   

 

                    36.4 C (97.5 F) 2019  1:35 PM CDT  



                                         Temperature   

 

                    16                  2019  2:52 PM CDT  



                                         Respiratory Rate   

 

                    97%                 2019  2:52 PM CDT  



                                         Oxygen Saturation   

 

                    -                   -                    



                                         Inhaled Oxygen   



                                         Concentration   

 

                    81.1 kg (178 lb 14.4 oz) 2019 12:00 PM CDT  



                                         Weight   

 

                    165.1 cm (5' 5")    2019 12:00 PM CDT  



                                         Height   

 

                    29.77               2019 12:00 PM CDT  



                                         Body Mass Index   







Plan of Treatment





   



                 Health Maintenance  Due Date        Last Done       Comments

 

   



                           COLONOSCOPY SCREENING     11/10/1997  

 

   



                           SHINGLES VACCINES (#1)    11/10/1997  

 

   



                     65+ PNEUMOCOCCAL VACCINE  11/10/2012          2007 



                                         (2 of 2 - PPSV23)   

 

   



                           INFLUENZA VACCINE         2020  







Procedures





                                        Comments



                 Procedure Name  Priority        Date/Time       Associated Diag

nosis 

 

                                         



                     EXTRACORPOREAL SHOCKWAVE   2019          KIDNEY STONE

 



                     LITHOTRIPSY (ESWL)   12:47 PM CDT        N20.0 

 

  



                                         Special



                                         Needs



                                         [facility]



                                         CONF #



                                         [id number]

 

                                        



Results for this procedure are in the results section.



                     POC GLUCOSE         Routine             2019  



                                         12:08 PM CDT  

 

                                        



Results for this procedure are in the results section.



                     XR ABDOMEN 1 VW     Routine             2019  



                                         11:41 AM CDT  

 

                                        



Results for this procedure are in the results section.



                 ECG PRE/POST OP  Routine         2019      Pre-op testing

 



                                         10:47 AM CDT  

 

                                        



Results for this procedure are in the results section.



                     ESTIMATED GFR       Routine             2019  



                                         10:44 AM CDT  

 

                                        



Results for this procedure are in the results section.



                 COMPREHENSIVE METABOLIC  Routine         2019      Pre-op

 testing 



                           PANEL                     10:44 AM CDT  

 

                                        



Results for this procedure are in the results section.



                 CBC HEMOGRAM    Routine         2019      Pre-op testing 



                                         10:44 AM CDT  



after 2019



Results

* POC glucose (2019 12:08 PM CDT)



    



              Component    Value        Ref Range    Performed At  Pathologist



                                         Signature

 

    



                 POC glucose     110 (H)         65 - 99 mg/dL   [place] 



                           Comment:                  MADAN GODFREY 



                           Meter ID: [id number]      [facility] 



                                         : Radha Foley   











                                         Specimen

 









   



                 Performing Organization  Address         City/[facility]

one Number

 

   



                     [facility]  [id number]    [place], 

[address] 



                                         PATHOLOGY AND GENOMIC   



                                         MEDICINE   

 

   



                     [facility]  [address] [place]

 [address] 



                                         [facility]   





* XR Abdomen 1 Vw (2019 11:41 AM CDT)







                                         Specimen

 









 



                           Narrative                 Performed At

 

 



                           EXAMINATION: XR ABDOMEN 1 VW   [facility]



                                         CLINICAL HISTORY: KUB for renal stone

 location preop 



                                         COMPARISON: 2019 



                                         IMPRESSION: 



                                         A couple of faint 3 mm calculi overlie 

the right lower renal pole. 



                                         The bowel gas pattern is unremarkable. 



                                         There is no significant skeletal abnorm

ality. 



                                         [id number] 







                                        Procedure Note

 

                                        



 Interface, Radiology Results Incoming - 2019 11:53 AM CDT



EXAMINATION:  XR ABDOMEN 1 VW



CLINICAL HISTORY:  KUB for renal stone location preop



COMPARISON:  2019



IMPRESSION:

A couple of faint 3 mm calculi overlie the right lower renal pole.

The bowel gas pattern is unremarkable.

There is no significant skeletal abnormality.



[id number]







   



                 Performing Organization  Address         [facility]  Ph

one Number

 

   



                      [facility]          [address] [place] [address] 





* ECG Pre/Post Op (2019 10:47 AM CDT)



    



              Component    Value        Ref Range    Performed At  Pathologist



                                         Signature

 

    



                     Ventricular         69                  [facility] 



                                         rate    

 

    



                     Atrial rate         69                  [facility] 

 

    



                     CA interval         172                 [facility] 

 

    



                     QRSD interval       110                 [facility] 

 

    



                     QT interval         396                 [facility] 

 

    



                     QTC interval        424                 [facility] 

 

    



                     P axis 1            10                  [facility] 

 

    



                     QRS axis 1          42                  [facility] 

 

    



                     T wave axis         34                  [facility] 

 

    



                     EKG impression      Normal sinus rhythm-Low   [facility] 



                                         voltage QRS-Incomplete right   



                                         bundle branch block-Borderline   



                                         ECG-In automated comparison   



                                         with ECG of 2019   



                                         09:24,-No significant change   



                                         was found-Electronically   



                                         Signed By Carlos Gonzalez MD   



                                         ([id number]) on 2019 12:12:27   



                                         PM   











                                         Specimen

 









 



                           Narrative                 Performed At

 

 



                                         This result has an attachment that is n

ot available. 







   



                 Performing Organization  Address         [facility]

one Number

 

   



                     [facility]            [address] [place] [address] 





* Estimated GFR (2019 10:44 AM CDT)



    



              Component    Value        Ref Range    Performed At  Pathologist



                                         [facility]

 

    



                 Estimated GFR   75              mL/min/1.73 m2  [place] 



                           Comment:                  [facility] 



                                         Interpretation   



                                         G1     >=90     



                                         Normal or high   



                                         G2     60-89    



                                         Mildly decreased   



                                         G3a    45-59    



                                         Mildly to moderately   



                                         decreased   



                                         G3b    30-44    



                                         Moderately to severely   



                                         decreased   



                                         G4     15-29    



                                         Severely decreased   



                                         G5     <15     



                                         Kidney failure   



                                         The eGFR was calculated using   



                                         the Chronic Kidney Disease   



                                         Epidemiology Collaboration   



                                         (CKD-EPI) equation.   



                                         Interpretation is based on   



                                         recommendations of the   



                                         National Kidney   



                                         Foundation-Kidney Disease   



                                         Outcomes Quality   



                                         Initiative (NKF-KDOQI)   



                                         published in 2014.   











                                         Specimen

 





                                         Plasma specimen







   



                 Performing Organization  Address         City[facility]  Ph

one Number

 

   



                     [facility]PARTMENT OF  [id number] [place] [address]    [place], 

[address] 



                                         PATHOLOGY AND GENOMIC   



                                         MEDICINE   

 

   



                     [facility]  [id number] [place] [address]    [place]

 [address] 



                                         [facility]   





* CBC hemogram (2019 10:44 AM CDT)



    



              Component    Value        Ref Range    Performed At  Pathologist



                                         Signature

 

    



                 WBC             5.44            4.50 - 11.00 k/uL  [facility] [place] 

 

    



                 RBC             5.36            4.40 - 6.00 m/uL  [facility] [place] [place] 

 

    



                 HGB             13.7 (L)        14.0 - 18.0 g/dL  [facility] [place] [place] 

 

    



                 HCT             44.3            41.0 - 51.0 %   [facility] [place] [place] 

 

    



                 MCV             82.6            82.0 - 100.0 fL  [facility] [place] [place] 

 

    



                 MCH             25.6 (L)        27.0 - 34.0 pg  [facility] [place] [place] 

 

    



                 MCHC            30.9 (L)        31.0 - 37.0 g/dL  [facility] [place] [place] 

 

    



                 RDW - SD        43.8            37.0 - 55.0 fL  [facility] [place] [place] 

 

    



                 MPV             10.6            8.8 - 13.2 fL   [facility] [place] [place] 

 

    



                 Platelet count  213             150 - 400 k/uL  [facility] [place] [place] 

 

    



                 Nucleated RBC   0.00            /100 WBC        [facility] [place] [place] 











                                         Specimen

 





                                         Blood







   



                 Performing Organization  Address         City/State/Zipcode  Ph

one Number

 

   



                     [facility]  [address]    [place], 

[address] 



                                         PATHOLOGY AND GENOMIC   



                                         MEDICINE   

 

   



                     [facility]  [address] [place]

 [address] 



                                         [facility]   





* Comprehensive metabolic panel (2019 10:44 AM CDT)



    



              Component    Value        Ref Range    Performed At  Pathologist



                                         Signature

 

    



                 Sodium          138             135 - 148 mEq/L  [facility] [place] [place] 

 

    



                 Potassium       4.2             3.5 - 5.0 mEq/L  [facility] [place] [place] 

 

    



                 Chloride        101             98 - 112 mEq/L  [facility] [place] [place] 

 

    



                 CO2             27              24 - 31 mEq/L   [facility] [place] [place] 

 

    



                 Anion gap       10@ANIO         7 - 15 mEq/L    [facility] [place] [place] 

 

    



                 BUN             13              8 - 23 mg/dL    [facility] [place] [place] 

 

    



                 Creatinine      1.00            0.70 - 1.20 mg/dL  [facility] [place] [place] 

 

    



                 Glucose         138 (H)         65 - 99 mg/dL   [facility] [place] [place] 

 

    



                 Calcium         9.7             8.8 - 10.2 mg/dL  [facility] [place] [place] 

 

    



                 Protein         7.4             6.3 - 8.3 g/dL  [place] 



                           Comment:                  [facility] 



                                         4.6-7.0 g/dL   



                                         1 week          



                                         4.4-7.6 g/dL   



                                         7 months-1year       



                                         5.1-7.3 g/dL   



                                         1-2 years       



                                         5.6-7.5 g/dL   



                                         >3 years     6.0-8.0   



                                         g/dL   



                                                   



                                         6.3-8.3 g/dL   

 

    



                 Albumin         4.5             3.5 - 5.0 g/dL  [facility] [place] [place] 

 

    



                 A/G ratio       1.6             0.7 - 3.8       [facility] [place] 

 

    



                 Alkaline        32 (L)          40 - 129 U/L    [place] 



                           phosphatase               [facility] 

 

    



                 AST             19              10 - 50 U/L     [facility] [place] 

 

    



                 ALT             23              5 - 50 U/L      [facility] [place] 

 

    



                 Total bilirubin  <0.2            0.0 - 1.2 mg/dL  [facility] [place] [place] 











                                         Specimen

 





                                         Plasma specimen







   



                 Performing Organization  Address         City/State/Zipcode  Ph

one Number

 

   



                     [facility]  [id number]    [place], 

[address] 



                                         PATHOLOGY AND GENOMIC   



                                         MEDICINE   

 

   



                     [facility]  [address] [place]

 [address] 



                                         [facility]   





after 2019



Insurance





                                        Type



            Payer      Benefit    Subscriber ID  Effective  Phone      Address 



                           Plan /                    Dates   



                                         Group     

 

                                        PPO



                 [employer] MEDICARE  HU[employer]          xxxxxxxxx       2018-P   



                           MEDICARE                  resent   



                                         PPO/PFFS/E     



                                         [facility]     







     



            Guarantor Name  Account    Relation to  Date of    Phone      [place] Address



                     Type                Patient             Birth  

 

     



            Jose EduardoZiyad BLAKE  Personal/F  Self       1947  [phone]  [address]

[address] [facility]               ([place])              [place] [address]







Advance Directives





For more information, please contact: [phone]







                          Patient Representative    Explanation



                           Type                      Date Recorded  

 

                                                     



                           Advance Directives,       2019 11:23 AM  



                                         Living Will and   



                                         Medical Power of

## 2020-08-18 NOTE — XMS REPORT
Summary of Care: 20 - 20

                             Created on: 2082



SELVIN PATEL

External Reference #: [id number]

: 1947

Sex: Male



Demographics





                          Address                   [address] [place] [address]  [address]

 

                          Home Phone                [phone]

 

                          Preferred Language        English

 

                          Marital Status            

 

                          Alevism Affiliation     Church

 

                          Race                      White

 

                                        Additional Race(s)  

 

                          Ethnic Group              /Latin





Author





                          Author                    [facility]

heast

 

                          Organization              [facility]

heast

 

                          Address                   Unknown

 

                          Phone                     Unavailable







Encounter





HQ Malur_kari(FIN) [id number] Date(s): 20 - 20

[facility] [address] [place], 
9-     [phone]

Attending Physician: Warren Chandra MD

Referring Physician: Lance Zurita MD





Vital Signs





No data available for this section



Problem List





    



              Condition    Effective Dates  Status       Health Status  Informan

t

 

    



                           Diabetes(Confirmed)       Resolved  

 

    



                           Simple                    Active  



                                         obesity(Confirmed)    







Allergies, Adverse Reactions, Alerts





   



                 Substance       Reaction        Severity        Status

 

   



                           penicillins               Active

 

   



                           contrast media            Active



                                         (iodine-based)   

 

   



                           levoFLOXacin              Active







Medications





No data available for this section



Results





No data available for this section



Immunizations





No data available for this section



Procedures





No data available for this section



Social History





 



                           Social History Type       Response

 

 



                           Smoking Status            Never smoker; Previous nika

tment: None; Ready to change: No; 

Concerns about



                                         tobacco use in household: No; Exposure 

to Tobacco Smoke None; Cigarette



                                         Smoking Last 365 Days No; Reg Smoking C

essation Counseling No



                                         entered on: 20







Assessment and Plan





No data available for this section

## 2020-08-18 NOTE — XMS REPORT
Summary of Care: 19 - 19

                             Created on: 2129



SANJUANA PATEL

External Reference #: [id number]

: 1947

Sex: Male



Demographics





                          Address                   [address] [place]  [address]

 

                          Home Phone                [phone]

 

                          Preferred Language        English

 

                          Marital Status            

 

                          Roman Catholic Affiliation     Cheondoism

 

                          Race                      Other

 

                                        Additional Race(s)  

 

                          Ethnic Group              Non-





Author





                          Author                    [facility]

heast

 

                          Organization              [facility]

heast

 

                          Address                   Unknown

 

                          Phone                     Unavailable







Encounter





HQ Segundo(FIN) [id number] Date(s): 19 - 19

[facility] [address] [place], 
4- [phone]

Discharge Disposition: Home or Self Care

Attending Physician: Warren Chandra MD

Referring Physician: Lance Zurita MD





Vital Signs





 



                           Most recent to            1



                                         oldest [Reference 



                                         Range]: 

 

 



                           Height                    165.1 cm



                                         (19 11:05 AM)

 

 



                           Temperature Oral          98.0 DegF



                           [96.4-99.1 DegF]          (19 11:05 AM)

 

 



                           Blood Pressure            96/61 mmHg



                           [/60-90 mmHg]       (19 11:05 AM)

 

 



                           Peripheral Pulse          80 bpm



                           Rate [ bpm]         (19 11:05 AM)

 

 



                           Weight                    82.636 kg



                                         (19 11:05 AM)

 

 



                           Body Mass Index           30.32 m2



                                         (19 11:05 AM)







Problem List





No data available for this section



Allergies, Adverse Reactions, Alerts





   



                 Substance       Reaction        Severity        Status

 

   



                           penicillins               Active

 

   



                           levoFLOXacin              Active







Medications





No Known Medications



Results





No data available for this section



Immunizations





No data available for this section



Procedures





No data available for this section



Social History





 



                           Social History Type       Response

 

 



                           Smoking Status            Never smoker; Previous nika

tment: None; Ready to change: No; 

Concerns about



                                         tobacco use in household: No; Exposure 

to Tobacco Smoke None; Cigarette



                                         Smoking Last 365 Days No; Reg Smoking C

essation Counseling No



                                         entered on: 19







Assessment and Plan





No data available for this section

## 2020-08-18 NOTE — DIAGNOSTIC IMAGING REPORT
EXAMINATION: CT of the abdomen and pelvis without contrast.

 

TECHNIQUE: Spiral CT images of the abdomen and pelvis were performed from the

lung bases to the lesser trochanters.  No intravenous contrast was given per

renal stone protocol..  Coronal and sagittal reformatted images were obtained.



COMPARISON: Renal ultrasound 5/1/2020



CLINICAL HISTORY:Abdominal pain, UTI symptoms and hematuria

     

DISCUSSION: ABSENCE OF INTRAVENOUS CONTRAST DECREASES SENSITIVITY FOR DETECTION

OF FOCAL LESIONS AND VASCULAR PATHOLOGY.



ABDOMEN/PELVIS:



LOWER THORAX: Lung bases are grossly clear. 



HEPATOBILIARY: Diffuse hepatic steatosis. Hepatic size and contour are normal.

No focal lesions.  No intra or extrahepatic biliary ductal dilation.



GALLBLADDER: No radio-opaque stones or sludge.  No wall thickening.



SPLEEN: No splenomegaly.



PANCREAS: No focal masses or ductal dilatation.



ADRENALS: No adrenal nodules.



KIDNEYS/URETERS: No renal or ureteral calculi, hydronephrosis or obstruction.

No contour abnormalities.

1.6 cm fluid density simple cyst in the left interpolar region (series 3, image

62).

Mild to moderate bilateral perinephric stranding.

No contour abnormalities.



PELVIC ORGANS/BLADDER: Moderate circumferential bladder wall thickening.

Prostate measures 4.8 x 3.4 x 3.9 cm (estimated volume 33 mL), and contains

dystrophic calcifications. Seminal vesicles are unremarkable.



PERITONEUM/RETROPERITONEUM: No free air or fluid.



LYMPH NODES: No intra-abdominal,retroperitoneal, pelvic or inguinal

lymphadenopathy.



VESSELS: Mild atherosclerotic calcification of the distal abdominal aorta.



GI TRACT: No bowel dilation or evidence of obstruction.



BONES AND SOFT TISSUES: No aggressive lytic or suspicious focal sclerotic

lesions. Small bilateral fat-containing inguinal hernias, left greater than

right. Soft tissues are otherwise unremarkable



IMPRESSION: 





1. Moderate circumferential bladder wall thickening. This may be secondary to

bladder outlet obstruction from a mildly enlarged prostate, however, cystitis

is also a consideration.



2. Mild to moderate bilateral perinephric stranding. No renal, ureteral or

bladder calculi. No hydronephrosis or obstruction. Unable to evaluate for

pyelonephritis given the lack of intravenous contrast.



3. Diffuse hepatic steatosis.



Signed by: Dr. Rober Arevalo M.D. on 8/18/2020 6:08 PM

## 2020-08-18 NOTE — XMS REPORT
Summary of Care: 3/29/18 - 3/29/18

                             Created on: 2081



SANJUANA PATEL

External Reference #: [id number]

: 1947

Sex: Male



Demographics





                          Address                   [address] [place] [address]  [address]

 

                          Home Phone                [phone]

 

                          Preferred Language        English

 

                          Marital Status            

 

                          Congregational Affiliation     Sikhism

 

                          Race                      White

 

                                        Additional Race(s)  

 

                          Ethnic Group              Non-





Author





                          Author                    [facility]

 

                          Organization              [facility]

 

                          Address                   Unknown

 

                          Phone                     Unavailable







Encounter





HQ Encntr_kari(FIN) [id number] Date(s): 3/29/18 - 3/29/18

[facility] [address] [facility], [address] [place], [place] [address] [phone]
[phone]

Attending Physician: [facility]

Referring Physician: Myles Murphy MD





Vital Signs





No data available for this section



Problem List





No data available for this section



Allergies, Adverse Reactions, Alerts





   



                 Substance       Reaction        Severity        Status

 

   



                           penicillins               Active

 

   



                           levoFLOXacin              Active







Medications





No Known Medications



Results





No data available for this section



Immunizations





No data available for this section



Procedures





No data available for this section



Social History





 



                           Social History Type       Response

 

 



                           Smoking Status            Never smoker; Previous nika

tment: None; Ready to change: No; 

Concerns about



                                         tobacco use in household: No; Exposure 

to Tobacco Smoke None; Cigarette



                                         Smoking Last 365 Days No; Reg Smoking C

essation Counseling No



                                         entered on: 3/29/18







Assessment and Plan





No data available for this section

## 2020-08-18 NOTE — XMS REPORT
Summary of Care: 20 - 20

                             Created on: 2084



SELVIN PATEL

External Reference #: [id number]

: 1947

Sex: Male



Demographics





                          Address                   [address] [place]  [address]

 

                          Home Phone                [phone]

 

                          Preferred Language        English

 

                          Marital Status            

 

                          Faith Affiliation     Sikh

 

                          Race                      White

 

                                        Additional Race(s)  

 

                          Ethnic Group              /Latin





Author





                          Author                    [facility]

heast

 

                          Organization              [facility]

heast

 

                          Address                   Unknown

 

                          Phone                     Unavailable







Encounter





HQ Julien_kari(FIN) [id number] Date(s): 20 - 20

[facility] [address] [place], [place] [address]
0- [phone]

Discharge Disposition: Home or Self Care

Attending Physician: Warren Chandra MD

Referring Physician: Lance Zurita MD





Vital Signs





 



                           Most recent to            1



                                         oldest [Reference 



                                         Range]: 

 

 



                           Height                    165.1 cm



                                         (20 1:21 PM)

 

 



                           Temperature Oral          98.1 DegF



                           [96.4-99.1 DegF]          (20 1:21 PM)

 

 



                           Blood Pressure            110/63 mmHg



                           [/60-90 mmHg]       (20 1:21 PM)

 

 



                           Peripheral Pulse          69 bpm



                           Rate [ bpm]         (20 1:21 PM)

 

 



                           Weight                    86.477 kg



                                         (20 1:21 PM)

 

 



                           Body Mass Index           31.73 m2



                                         (20 1:21 PM)







Problem List





    



              Condition    Effective Dates  Status       Health Status  Informan

t

 

    



                           Diabetes(Confirmed)       Resolved  

 

    



                           Simple                    Active  



                                         obesity(Confirmed)    







Allergies, Adverse Reactions, Alerts





   



                 Substance       Reaction        Severity        Status

 

   



                           penicillins               Active

 

   



                           contrast media            Active



                                         (iodine-based)   

 

   



                           levoFLOXacin              Active







Medications





No data available for this section



Results





No data available for this section



Immunizations





No data available for this section



Procedures





No data available for this section



Social History





 



                           Social History Type       Response

 

 



                           Smoking Status            Never smoker; Previous nika

tment: None; Ready to change: No; 

Concerns about



                                         tobacco use in household: No; Exposure 

to Tobacco Smoke None; Cigarette



                                         Smoking Last 365 Days No; Reg Smoking C

essation Counseling No



                                         entered on: 20







Assessment and Plan





No data available for this section

## 2020-08-18 NOTE — XMS REPORT
Summary of Care: 3/29/18 - 3/30/18

                             Created on: 2068



SANJUANA PATEL

External Reference #: [id number]

: 1947

Sex: Male



Demographics





                          Address                   [address] [place] [address]  [address]

 

                          Home Phone                [phone]

 

                          Preferred Language        English

 

                          Marital Status            

 

                          Mormonism Affiliation     Congregational

 

                          Race                      White

 

                                        Additional Race(s)  

 

                          Ethnic Group              Non-





Author





                          Author                    [facility]

 

                          Organization              [facility]

 

                          Address                   Unknown

 

                          Phone                     Unavailable







Encounter





HQ Encntr_kari(FIN) [id number] Date(s): 3/29/18 - 3/30/18

[facility] [address] [facility], [address] [place], [place] [address] [phone]
[phone]





Vital Signs





No data available for this section



Problem List





No data available for this section



Allergies, Adverse Reactions, Alerts





   



                 Substance       Reaction        Severity        Status

 

   



                           penicillins               Active

 

   



                           levoFLOXacin              Active







Medications





No data available for this section



Results





No data available for this section



Immunizations





No data available for this section



Procedures





No data available for this section



Social History





 



                           Social History Type       Response

 

 



                           Smoking Status            Never smoker; Previous nika

tment: None; Ready to change: No; 

Concerns about



                                         tobacco use in household: No; Exposure 

to Tobacco Smoke None; Cigarette



                                         Smoking Last 365 Days No; Reg Smoking C

essation Counseling No



                                         entered on: 3/29/18







Assessment and Plan





No data available for this section

## 2020-08-18 NOTE — XMS REPORT
Summary of Care: 1/10/20 - 20

                             Created on: 2073



SELVIN PATEL

External Reference #: [id number]

: 1947

Sex: Male



Demographics





                          Address                   [address] [place] [address]  [address]

 

                          Home Phone                [phone]

 

                          Preferred Language        English

 

                          Marital Status            

 

                          Mandaeism Affiliation     Adventism

 

                          Race                      Other

 

                                        Additional Race(s)  

 

                          Ethnic Group              /Latin





Author





                          Author                    [facility]

 

                          Address                   Unknown

 

                          Phone                     Unavailable







Encounter





HQ Encntr_alias(FIN) [id number] Date(s): 1/10/20 - 20

[facility] [address] [facility] [place] [address]-  
  [phone]





Vital Signs





No data available for this section



Problem List





    



              Condition    Effective Dates  Status       Health Status  Informan

t

 

    



                           Diabetes(Confirmed)       Resolved  

 

    



                           Simple                    Active  



                                         obesity(Confirmed)    







Allergies, Adverse Reactions, Alerts





   



                 Substance       Reaction        Severity        Status

 

   



                           penicillins               Active

 

   



                           contrast media            Active



                                         (iodine-based)   

 

   



                           levoFLOXacin              Active







Medications





No data available for this section



Results





No data available for this section



Immunizations





No data available for this section



Procedures





No data available for this section



Social History





 



                           Social History Type       Response

 

 



                           Smoking Status            Never smoker; Previous nika

tment: None; Ready to change: No; 

Concerns about



                                         tobacco use in household: No; Exposure 

to Tobacco Smoke None; Cigarette



                                         Smoking Last 365 Days No; Reg Smoking C

essation Counseling No



                                         entered on: 19







Assessment and Plan





No data available for this section

## 2020-08-18 NOTE — XMS REPORT
Summary of Care: 20 - 20

                             Created on: 2127



SELVIN PATEL

External Reference #: [id number]

: 1947

Sex: Male



Demographics





                          Address                   [address] [place]  [address]

 

                          Home Phone                [phone]

 

                          Preferred Language        English

 

                          Marital Status            

 

                          Denominational Affiliation     Bahai

 

                          Race                      White

 

                                        Additional Race(s)  

 

                          Ethnic Group              /Latin





Author





                          Author                    [facility]

ospital

 

                          Organization              [facility]

 

                          Address                   Unknown

 

                          Phone                     Unavailable







Encounter





JADIEL Raymond(ELIEL) [id number] Date(s): 20 - 20

[facility] [address] [place] [address]-     [phone]
[phone]

Discharge Disposition: Home or Self Care

Attending Physician: Warren Chandra MD

Admitting Physician: Warren Chandra MD

Referring Physician: Warren Chandra MD





Vital Signs





No data available for this section



Problem List





    



              Condition    Effective Dates  Status       Health Status  Informan

t

 

    



                           Diabetes(Confirmed)       Resolved  

 

    



                           Simple                    Active  



                                         obesity(Confirmed)    







Allergies, Adverse Reactions, Alerts





   



                 Substance       Reaction        Severity        Status

 

   



                           penicillins               Active

 

   



                           contrast media            Active



                                         (iodine-based)   

 

   



                           levoFLOXacin              Active







Medications





No data available for this section



Results









 



                           Most recent to            1



                                         oldest [Reference 



                                         Range]: 

 

 



                           eGFR                      85 mL/min/1.73m2 1



                                         *NA*



                                         (20 8:19 AM)

 

 



                           POC Creatinine            0.9 mg/dL



                           [0.5-1.4 mg/dL]           (20 8:19 AM)







1Result Comment: The eGFR is calculated using the CKD-EPI formula. In most 
young, healthy individuals the eGFR will be >90 mL/min/1.73m2. The eGFR declines
with age. An eGFR of 60-89 may be normal in some populations, particularly the 
elderly, for whom the CKD-EPI formula has not been extensively validated. Use of
the eGFR is not recommended in the following populations:



Individuals with unstable creatinine concentrations, including pregnant patients
and those with serious co-morbid conditions.



Patients with extremes in muscle mass or diet. 



The data above are obtained from the National Kid[employer] Disease Education Program (
NKDEP) which additionally recommends that when the eGFR is used in patients with
extremes of body mass index for purposes of drug dosing, the eGFR should be mul
tiplied by the estimated BMI.



Immunizations





No data available for this section



Procedures





No data available for this section



Social History





 



                           Social History Type       Response

 

 



                           Smoking Status            Never smoker; Previous nika

tment: None; Ready to change: No; 

Concerns about



                                         tobacco use in household: No; Exposure 

to Tobacco Smoke None; Cigarette



                                         Smoking Last 365 Days No; Reg Smoking C

essation Counseling No



                                         entered on: 20







Assessment and Plan





No data available for this section
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
## 2020-08-18 NOTE — EMERGENCY DEPARTMENT NOTE
History of Present Illnes


History of Present Illness


Chief Complaint:  Abdominal Complaints


History of Present Illness


This is a 72 year old  male Chief Complaint Comment PAIN with urination.

PT STATES HASNT PEED IN ENTIRE WK. PT is poor HISTORIAN. PT AAOX4. AMBULATORY. 

STATES LOWER BACK AND PENILE PAIN. NOW STATES SAW HIS PCP YESTERDAY AND PUT ON 

UNK ANTIBIOTICS AND NOT BETTER AFTER one dose.


Historian:  Patient


Arrival Mode:  Car


Onset (how long ago):  week(s)


Location:  Penis


Quality:  burning


Radiation:  Reports back


Severity:  moderate


Onset quality:  gradual


Duration (how long):  week(s) (1)


Timing of current episode:  constant


Progression:  worsening


Chronicity:  new


Context:  Reports recent illness (UTI)


Relieving factors:  none


Associated symptoms:  Reports denies other symptoms


Treatments prior to arrival:  none





Past Medical/Family History


Physician Review


I have reviewed the patient's past medical and family history.  Any updates have

been documented here.





Past Medical History


Recent Fever:  No


Clinical Suspicion of Infectio:  No


New/Unexplained Change in Ment:  No


Other Surgery:  


rotater cuff, hernia repair, thyroid removed





Social History


Smoking Cessation:  Never Smoker


Counseling Performed:  No


Alcohol Use:  None


Any Illegal Drug Use:  No





Other


Last Tetanus:  unk


Any Pre-Existing Lines (PICC,:  No





Review of Systems


Review of Systems


Constitutional:  Reports no symptoms


EENTM:  Reports no symptoms


Cardiovascular:  Reports no symptoms


Respiratory:  Reports no symptoms


Gastrointestinal:  Reports no symptoms


Genitourinary:  Reports as per HPI, Reports dysuria, Reports frequency


Musculoskeletal:  Reports no symptoms


Integumentary:  Reports no symptoms


Neurological:  Reports no symptoms


Psychological:  Reports no symptoms


Endocrine:  Reports no symptoms


Hematological/Lymphatic:  Reports no symptoms





Physical Exam


Related Data


Allergies:  


Coded Allergies:  


     Penicillins (Verified  Allergy, Mild, 10/4/12)


     levofloxacin (Verified  Allergy, Mild, ITCHING, 2/28/12)


Uncoded Allergies:  


     IVP DYE (Allergy, Mild, 10/4/12)


Triage Vital Signs





Vital Signs








  Date Time  Temp Pulse Resp B/P (MAP) Pulse Ox O2 Delivery O2 Flow Rate FiO2


 


8/18/20 14:56 101.8 79 16 101/65 98 Room Air  








Vital signs reviewed:  Yes





Physical Exam


CONSTITUTIONAL





Constitutional:  Present well-developed, Present well-nourished


HENT


HENT:  Present normocephalic, Present atraumatic, Present oropharynx 

clear/moist, Present nose normal


HENT L/R:  Present left ext ear normal, Present right ext ear normal


EYES





Eyes:  Reports PERRL, Reports conjunctivae normal


NECK


Neck:  Present ROM normal


PULMONARY


Pulmonary:  Present effort normal, Present breath sounds normal


CARDIOVASCULAR





Cardiovascular:  Present regular rhythm, Present heart sounds normal, Present 

capillary refill normal, Present normal rate


GASTROINTESTINAL





Abdominal:  Present soft, Present nontender, Present bowel sounds normal


GENITOURINARY





Genitourinary:  Present exam deferred


SKIN


Skin:  Present warm, Present dry


MUSCULOSKELETAL





Musculoskeletal:  Present ROM normal


NEUROLOGICAL





Neurological:  Present alert, Present oriented x 3, Present no gross motor or 

sensory deficits


PSYCHOLOGICAL


Psychological:  Present mood/affect normal, Present judgement normal





Results


Laboratory


Result Diagram:  


8/18/20 1500





Laboratory





Laboratory Tests








Test


 8/18/20


15:00


 


White Blood Count


 10.35 x10e3/uL


(4.8-10.8)


 


Red Blood Count


 5.58 x10e6/uL


(4.3-5.7)


 


Hemoglobin


 13.8 g/dL


(14.0-18.0)


 


Hematocrit


 42.2 %


(38.2-49.6)


 


Mean Corpuscular Volume


 75.6 fL


(81-99)


 


Mean Corpuscular Hemoglobin


 24.7 pg


(28-32)


 


Mean Corpuscular Hemoglobin


Concent 32.7 g/dL


(31-35)


 


Red Cell Distribution Width


 14.5 %


(11.7-14.4)


 


Platelet Count


 332 x10e3/uL


(140-360)


 


Neutrophils (%) (Auto)


 76.1 %


(38.7-80.0)


 


Lymphocytes (%) (Auto)


 12.7 %


(18.0-39.1)


 


Monocytes (%) (Auto)


 8.8  %


(4.4-11.3)


 


Eosinophils (%) (Auto)


 1.4 %


(0.0-6.0)


 


Basophils (%) (Auto)


 0.2 %


(0.0-1.0)


 


Neutrophils # (Auto) 7.9 (2.1-6.9) 


 


Lymphocytes # (Auto) 1.3 (1.0-3.2) 


 


Monocytes # (Auto) 0.9 (0.2-0.8) 


 


Eosinophils # (Auto) 0.1 (0.0-0.4) 


 


Basophils # (Auto) 0.0 (0.0-0.1) 


 


Absolute Immature Granulocyte


(auto 0.08 x10e3/uL


(0-0.1)











Assessment & Plan


Medical Decision Making


MDM


72-year-old male presents for concerns over urinary tract infection. He was 

recently diagnosed with urinary tract infection place and antibiotics. He has 

taken one dose. Vital signs stable, within normal limits. Workup shows urinary 

tract infection. Doubt emergent process at this time. I discussed with patient 

that he needs to continue taking his antibiotics and to follow-up with his 

doctor concerning this. He will return to emergency department if new or worseni

ng symptoms. Patient is appropriate for discharge. Do not suspect sepsis this 

time.





Reassessment


Reassessment time:  16:02


Reassessment


Well appearing, NAD





Assessment & Plan


Final Impression:  


(1) UTI (urinary tract infection)


Depart Disposition:  HOME, SELF-CARE


Last Vital Signs











  Date Time  Temp Pulse Resp B/P (MAP) Pulse Ox O2 Delivery O2 Flow Rate FiO2


 


8/18/20 14:56 101.8 79 16 101/65 98 Room Air  








Home Meds


Reported Medications


Metformin Hcl (METFORMIN HCL) 500 Mg Tablet, 500 MG PO BID, #60 TAB


   7/27/15


Tamsulosin Hcl (TAMSULOSIN HCL) 0.4 Mg Cap.er.24h, 0.4 MG PO DAILY


   7/27/15


Solifenacin Succinate (VESICARE) 5 Mg Tablet, 5 MG PO DAILY, #30 TAB


   7/27/15


Fenofibrate Nanocrystallized (FENOFIBRATE) 145 Mg Tablet, 145 MG PO DAILY


   7/27/15


Minocycline Hcl (MINOCYCLINE HCL) 50 Mg Capsule, 100 MG PO DAILY, #60 TAB


   7/27/15


[Synthroid]   No Conflict Check, 125 MCG PO DAILY


   8/13/12


Lansoprazole (PREVACID SOLUTAB) 30 Mg Tabdp, 30 MG PO DAILY


   8/13/12


[Avodart]   No Conflict Check, 5 MG PO DAILY


   8/13/12


[Amarayl]   No Conflict Check, 2 MG PO DAILY


   8/13/12


[Lisinopril]   No Conflict Check, 5 MG PO DAILY


   8/13/12


[Neurontin]   No Conflict Check, 600 MG PO DAILY


   8/13/12











ROMULO KING MD          Aug 18, 2020 16:02

## 2020-08-18 NOTE — XMS REPORT
Clinical Summary

                             Created on: 2020



Ziyad Whitt

External Reference #: [id number]

: 1947

Sex: Male



Demographics





                          Address                   [address] [id number]

 

                          Home Phone                [phone]

 

                          Preferred Language        Unknown

 

                          Marital Status            Unknown

 

                          Protestant Affiliation     Unknown

 

                          Race                      Unknown

 

                          Ethnic Group              Unknown





Author





                          Author                    [facility]

 

                          Address                   Unknown

 

                          Phone                     Unavailable







Care Team Providers





                    Care Team Member Name Role                Phone

 

                          PCP                       Unavailable







Allergies

Not on File



Medications

Not on file



Active Problems





Not on file



Social History





                                        Date



                 Tobacco Use     Types           Packs/Day       Years Used 

 

                                         



                                         Never Assessed    







 



                           Sex Assigned at Birth     Date Recorded

 

 



                                         Not on file 







                                        Industry



                           Job Start Date            Occupation 

 

                                        Not on file



                           Not on file               Not on file 







                                        Travel End



                           Travel History            Travel Start 

 





                                         No recent travel history available.







Last Filed Vital Signs

Not on file



Plan of Treatment





   



                 Health Maintenance  Due Date        Last Done       Comments

 

   



                           COLON CANCER SCREENING    1947  



                                         COLONOSCOPY   

 

   



                           PNEUMOCOCCAL 65+          11/10/2012  



                                         LOW/MEDIUM RISK (1 of 2 -   



                                         PCV13)   

 

   



                           INFLUENZA VACCINE (#1)    2020  







Results

Not on fileafter 2019

## 2020-08-18 NOTE — XMS REPORT
Summary of Care: 3/29/18 - 3/29/18

                             Created on: 2038



SANJUANA PATEL

External Reference #: [id number]

: 1947

Sex: Male



Demographics





                          Address                   [address] [place]  [address]-

 

                          Home Phone                [phone]

 

                          Preferred Language        English

 

                          Marital Status            

 

                          Lutheran Affiliation     Religious

 

                          Race                      White

 

                                        Additional Race(s)  

 

                          Ethnic Group              Non-





Author





                          Author                    [facility]

 

                          Organization              [facility]

 

                          Address                   Unknown

 

                          Phone                     Unavailable







Encounter





HQ Encntr_kari(FIN) [id number] Date(s): 3/29/18 - 3/29/18

[facility] [place] [address] [place] [address]-      [phone]
[phone]

Encounter Diagnosis

Zygomatic fracture, left side, initial encounter for closed fracture (Final) - 
18

Traumatic subdural hemorrhage with loss of consciousness of unspecified duration
, initial encounter (Final) - 

Discharge Disposition: Home or Self Care

Attending Physician: Myles Murphy MD





Vital Signs





No data available for this section



Problem List





No data available for this section



Allergies, Adverse Reactions, Alerts





   



                 Substance       Reaction        Severity        Status

 

   



                           penicillins               Active

 

   



                           levoFLOXacin              Active







Medications





No data available for this section



Results





No data available for this section



Immunizations





No data available for this section



Procedures





No data available for this section



Social History





 



                           Social History Type       Response

 

 



                           Smoking Status            Never smoker; Previous nika

tment: None; Ready to change: No; 

Concerns about



                                         tobacco use in household: No; Exposure 

to Tobacco Smoke None; Cigarette



                                         Smoking Last 365 Days No; Reg Smoking C

essation Counseling No



                                         entered on: 3/29/18







Assessment and Plan





No data available for this section

## 2020-08-27 NOTE — DIAGNOSTIC IMAGING REPORT
EXAMINATION:  CHEST SINGLE (PORTABLE)    



INDICATION: Fever



COMPARISON:  Chest x-ray on 8/18/2020.

     

FINDINGS:    



TUBES and LINES:  None.



LUNGS:  Normal lung volumes. There is hazy opacification the bilateral lung

bases, right more to left.



PLEURA:  No pleural effusion or pneumothorax.



HEART AND MEDIASTINUM:  The cardiomediastinal silhouette is unremarkable.    



BONES AND SOFT TISSUES:  No acute osseous lesion.  Soft tissues are

unremarkable.



UPPER ABDOMEN: No free air under the diaphragm.    



IMPRESSION: 



Hazy opacification of bilateral lung bases, right more to left which may

represent aspiration and/or developing multifocal pneumonia in the proper

clinical setting.



Signed by: Kandice Carlson MD on 8/27/2020 8:23 PM
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
## 2020-08-27 NOTE — XMS REPORT
Clinical Summary

                             Created on: 2020



Ziyad Whitt

External Reference #: [id number]

: 1947

Sex: Male



Demographics





                          Address                   [address] [id number]

 

                          Home Phone                [phone]

 

                          Preferred Language        Unknown

 

                          Marital Status            Unknown

 

                          Worship Affiliation     Unknown

 

                          Race                      Unknown

 

                          Ethnic Group              Unknown





Author





                          Author                    [facility]

 

                          Address                   Unknown

 

                          Phone                     Unavailable







Care Team Providers





                    Care Team Member Name Role                Phone

 

                          PCP                       Unavailable







Allergies

Not on File



Medications

Not on file



Active Problems





Not on file



Social History





                                        Date



                 Tobacco Use     Types           Packs/Day       Years Used 

 

                                         



                                         Never Assessed    







 



                           Sex Assigned at Birth     Date Recorded

 

 



                                         Not on file 







                                        Industry



                           Job Start Date            Occupation 

 

                                        Not on file



                           Not on file               Not on file 







                                        Travel End



                           Travel History            Travel Start 

 





                                         No recent travel history available.







Last Filed Vital Signs

Not on file



Plan of Treatment





   



                 Health Maintenance  Due Date        Last Done       Comments

 

   



                           COLON CANCER SCREENING    1947  



                                         COLONOSCOPY   

 

   



                           PNEUMOCOCCAL 65+          11/10/2012  



                                         LOW/MEDIUM RISK (1 of 2 -   



                                         PCV13)   

 

   



                           INFLUENZA VACCINE (#1)    2020  







Results

Not on fileafter 2019

## 2020-08-27 NOTE — XMS REPORT
Clinical Summary

                             Created on: 2020



Ziyad Whitt

External Reference #: [id number]

: 1947

Sex: Male



Demographics





                          Address                   [address] [place]  [address]

 

                          Home Phone                [phone]

 

                          Preferred Language        English

 

                          Marital Status            

 

                          Yazidi Affiliation     Unknown

 

                          Race                      White

 

                          Ethnic Group              /Latin





Author





                          Author                    [facility]

 

                          Organization              [employer]

 

                          Address                   Unknown

 

                          Phone                     Unavailable







Support





                Name            Relationship    Address         Phone

 

                Aditi PAREDES            Unknown         [phone]







Care Team Providers





                    Care Team Member Name Role                Phone

 

                    Masood Meza MD PCP                 [phone]

[phone]







Allergies





                                        Comments



                 Active Allergy  Reactions       Severity        Noted Date 

 

                                         



                     Levofloxacin        GI                  2019 



                                         Intolerance   

 

                                         



                     Penicillins         Swelling            2019 







Medications





                          End Date                  Status



              Medication   Sig          Dispensed    Refills      Start  



                                         Date  

 

                                                    Active



                     fenofibrate (TRICOR) 145  Take 145 mg         0   



                           MG tablet                 by mouth     



                                         nightly.     

 

                                                    Active



                     lisinopril          Take 5 mg by        0   



                           (PRINIVIL,ZESTRIL) 5 mg   mouth every     



                           tablet                    evening.     

 

                                                    Active



                     glimepiride (AMARYL) 4 MG  Take 4 mg by        0   



                           tablet                    mouth 2 (two)     



                                         times a day.     

 

                                                    Active



                     TESTOSTERONE CYPIONATE IM  Inject into         0   



                                         the shoulder,     



                                         thigh, or     



                                         buttocks     



                                         every 14     



                                         (fourteen)     



                                         days.     

 

                                                    Active



                     sildenafil citrate  Take by             0   



                           (VIAGRA ORAL)             mouth.     

 

                                                    Active



                     solifenacin succinate  Take by             0   



                           (VESICARE ORAL)           mouth.     

 

                                                    Active



                     lansoprazole (PREVACID)  Take 15 mg by       0   



                           15 MG capsule             mouth daily.     

 

                                                    Active



                     gabapentin (NEURONTIN)  Take 600 mg         0   



                           600 mg tablet             by mouth 3     



                                         (three) times     



                                         a day.     

 

                                                    Active



                     metFORMIN (GLUCOPHAGE)  Take 500 mg         0   



                           500 mg tablet             by mouth     



                                         daily with     



                                         breakfast.     

 

                                                    Active



                     levothyroxine (SYNTHROID,  Take 125 mcg        0   



                           LEVOXYL) 125 mcg tablet   by mouth     



                                         daily.     

 

                                                    Active



                     dutasteride (AVODART) 0.5  Take 0.5 mg         0   



                           mg capsule                by mouth     



                                         daily.     

 

                          2019                Discontinued (Therapy comple

carolina)



                     tamsulosin (FLOMAX) 0.4  Take 0.4 mg         0   



                           mg capsule                by mouth     



                                         daily with     



                                         dinner.     

 

                          2019                Discontinued (Stop Taking at

 Discharge)



                     aspirin (ECOTRIN) 81 MG  Take 81 mg by       0   



                           enteric coated tablet     mouth daily.     

 

                          2019                Discontinued (Stop Taking at

 Discharge)



                     docosahexanoic acid/epa  Take by             0   



                           (FISH OIL ORAL)           mouth.     







Active Problems





 



                           Problem                   Noted Date

 

 



                           Calculus of kidney        2019







Encounters





                          Care Team                 Description



                     Date                Type                Specialty  

 

                                        



Mandeep Burns MD                  [facility]



                     2019          Surgery             General Surgery  

 

                                        



Sánchez Armendariz MD Strever, Michael Shane, RUMA             



                     2019          Anesthesia          General Surgery  



                                         Event   

 

                                        



Mandeep Burns MD                   



                     2019          Hospital            General Surgery  



                                         Encounter   



after 2019



Family History





   



                 Medical History  Relation        Name            Comments

 

   



                           Diabetes                  Mother  







   



                 Relation        Name            Status          Comments

 

   



                           Father                     

 

   



                           Mother                     







Social History





                                        Date



                 Tobacco Use     Types           Packs/Day       Years Used 

 

                                         



                 Former Smoker   Cigarettes      0.2             30 

 

    



                                         Smokeless Tobacco: Never   



                                         Used   







                    Drinks/Week         oz/Week             Comments



                                         Alcohol Use   

 

                                                            ex social smoker



                                         Not Currently   







 



                           Sex Assigned at Birth     Date Recorded

 

 



                                         Not on file 







                                        Industry



                           Job Start Date            Occupation 

 

                                        Not on file



                           Not on file               Not on file 







                                        Travel End



                           Travel History            Travel Start 

 





                                         No recent travel history available.







Last Filed Vital Signs





                    Reading             Time Taken          Comments



                                         Vital Sign   

 

                    170/84              2019  2:52 PM CDT  



                                         Blood Pressure   

 

                    70                  2019  2:52 PM CDT  



                                         Pulse   

 

                    36.4 C (97.5 F) 2019  1:35 PM CDT  



                                         Temperature   

 

                    16                  2019  2:52 PM CDT  



                                         Respiratory Rate   

 

                    97%                 2019  2:52 PM CDT  



                                         Oxygen Saturation   

 

                    -                   -                    



                                         Inhaled Oxygen   



                                         Concentration   

 

                    81.1 kg (178 lb 14.4 oz) 2019 12:00 PM CDT  



                                         Weight   

 

                    165.1 cm (5' 5")    2019 12:00 PM CDT  



                                         Height   

 

                    29.77               2019 12:00 PM CDT  



                                         Body Mass Index   







Plan of Treatment





   



                 Health Maintenance  Due Date        Last Done       Comments

 

   



                           COLONOSCOPY SCREENING     11/10/1997  

 

   



                           SHINGLES VACCINES (#1)    11/10/1997  

 

   



                     65+ PNEUMOCOCCAL VACCINE  11/10/2012          2007 



                                         (2 of 2 - PPSV23)   

 

   



                           INFLUENZA VACCINE         10/01/2020  







Procedures





                                        Comments



                 Procedure Name  Priority        Date/Time       Associated Diag

nosis 

 

                                         



                     EXTRACORPOREAL SHOCKWAVE   2019          KIDNEY STONE

 



                     LITHOTRIPSY (ESWL)   12:47 PM CDT        N20.0 

 

  



                                         Special



                                         Needs



                                         [facility]



                                         CONF #



                                         [id number]

 

                                        



Results for this procedure are in the results section.



                     POC GLUCOSE         Routine             2019  



                                         12:08 PM CDT  

 

                                        



Results for this procedure are in the results section.



                     XR ABDOMEN 1 VW     Routine             2019  



                                         11:41 AM CDT  



after 2019



Results

* POC glucose (2019 12:08 PM CDT)



    



              Component    Value        Ref Range    Performed At  Pathologist



                                         Signature

 

    



                 POC glucose     110 (H)         65 - 99 mg/dL   [place] 



                           Comment:                  [facility] 



                           Meter ID: [id number]      [facility] 



                                         : Radha Foley   











                                         Specimen

 









   



                 Performing Organization  Address         [facility]  Ph

one Number

 

   



                     [facility]  [address]    [place], 

[address] 



                                         PATHOLOGY AND GENOMIC   



                                         MEDICINE   

 

   



                     [place] [address]  [address] [place]

 [address] 



                                         [facility]   





* XR Abdomen 1 Vw (2019 11:41 AM CDT)







                                         Specimen

 









 



                           Narrative                 Performed At

 

 



                           EXAMINATION: XR ABDOMEN 1 VW  [facility]



                                         CLINICAL HISTORY: KUB for renal stone

 location preop 



                                         COMPARISON: 2019 



                                         IMPRESSION: 



                                         A couple of faint 3 mm calculi overlie 

the right lower renal pole. 



                                         The bowel gas pattern is unremarkable. 



                                         There is no significant skeletal abnorm

ality. 



                                         [id number] 







                                        Procedure Note

 

                                        



Hm Interface, Radiology Results Incoming - 2019 11:53 AM CDT



EXAMINATION:  XR ABDOMEN 1 VW



CLINICAL HISTORY:  KUB for renal stone location preop



COMPARISON:  2019



IMPRESSION:

A couple of faint 3 mm calculi overlie the right lower renal pole.

The bowel gas pattern is unremarkable.

There is no significant skeletal abnormality.



[id number]







   



                 Performing Organization  Address         City/[facility]  Ph

one Number

 

   



                      [facility]          [address] [place] [address] 





after 2019



Insurance





                                        Type



            Payer      Benefit    Subscriber ID  Effective  Phone      Address 



                           Plan /                    Dates   



                                         Group     

 

                                        PPO



                 [employer] MEDICARE  [employer]          xxxxxxxxx       2018-P   



                           MEDICARE                  resent   



                                         PPO/PFFS/E     



                                         [facility]     







     



            Guarantor Name  Account    Relation to  Date of    Phone      Billin

g Address



                     Type                Patient             Birth  

 

     



            Jose EduardoNavarroo DAWN  Personal/F  Self       1947  [phone]  [address]               (Home)              [place] [address]







Advance Directives





For more information, please contact: [phone]







                          Patient Representative    Explanation



                           Type                      Date Recorded  

 

                                                     



                           Advance Directives,       2019 11:23 AM  



                                         Living Will and   



                                         Medical Power of

## 2020-08-27 NOTE — XMS REPORT
Clinical Summary

                             Created on: 2020



Ziyad Whitt

External Reference #: [id number]

: 1947

Sex: Male



Demographics





                          Address                   [address] [place]  [address]

 

                          Home Phone                [phone]

 

                          Preferred Language        English

 

                          Marital Status            

 

                          Mandaen Affiliation     Unknown

 

                          Race                      White

 

                          Ethnic Group              /Latin





Author





                          Author                    [facility]

 

                          Organization              [facility]

 

                          Address                   Unknown

 

                          Phone                     Unavailable







Support





                Name            Relationship    Address         Phone

 

                Aditi PAREDES            Unknown         [phone]







Care Team Providers





                    Care Team Member Name Role                Phone

 

                    Masood Meza MD PCP                 [phone]

[phone]







Allergies





                                        Comments



                 Active Allergy  Reactions       Severity        Noted Date 

 

                                         



                     Levofloxacin        GI                  2019 



                                         Intolerance   

 

                                         



                     Penicillins         Swelling            2019 







Medications





                          End Date                  Status



              Medication   Sig          Dispensed    Refills      Start  



                                         Date  

 

                                                    Active



                     fenofibrate (TRICOR) 145  Take 145 mg         0   



                           MG tablet                 by mouth     



                                         nightly.     

 

                                                    Active



                     lisinopril          Take 5 mg by        0   



                           (PRINIVIL,ZESTRIL) 5 mg   mouth every     



                           tablet                    evening.     

 

                                                    Active



                     glimepiride (AMARYL) 4 MG  Take 4 mg by        0   



                           tablet                    mouth 2 (two)     



                                         times a day.     

 

                                                    Active



                     TESTOSTERONE CYPIONATE IM  Inject into         0   



                                         the shoulder,     



                                         thigh, or     



                                         buttocks     



                                         every 14     



                                         (fourteen)     



                                         days.     

 

                                                    Active



                     sildenafil citrate  Take by             0   



                           (VIAGRA ORAL)             mouth.     

 

                                                    Active



                     solifenacin succinate  Take by             0   



                           (VESICARE ORAL)           mouth.     

 

                                                    Active



                     lansoprazole (PREVACID)  Take 15 mg by       0   



                           15 MG capsule             mouth daily.     

 

                                                    Active



                     gabapentin (NEURONTIN)  Take 600 mg         0   



                           600 mg tablet             by mouth 3     



                                         (three) times     



                                         a day.     

 

                                                    Active



                     metFORMIN (GLUCOPHAGE)  Take 500 mg         0   



                           500 mg tablet             by mouth     



                                         daily with     



                                         breakfast.     

 

                                                    Active



                     levothyroxine (SYNTHROID,  Take 125 mcg        0   



                           LEVOXYL) 125 mcg tablet   by mouth     



                                         daily.     

 

                                                    Active



                     dutasteride (AVODART) 0.5  Take 0.5 mg         0   



                           mg capsule                by mouth     



                                         daily.     

 

                          2019                Discontinued (Therapy comple

carolina)



                     tamsulosin (FLOMAX) 0.4  Take 0.4 mg         0   



                           mg capsule                by mouth     



                                         daily with     



                                         dinner.     

 

                          2019                Discontinued (Stop Taking at

 Discharge)



                     aspirin (ECOTRIN) 81 MG  Take 81 mg by       0   



                           enteric coated tablet     mouth daily.     

 

                          2019                Discontinued (Stop Taking at

 Discharge)



                     docosahexanoic acid/epa  Take by             0   



                           (FISH OIL ORAL)           mouth.     







Active Problems





 



                           Problem                   Noted Date

 

 



                           Calculus of kidney        2019







Encounters





                          Care Team                 Description



                     Date                Type                Specialty  

 

                                        



Mandeep Burns MD                  [facility]



                     2019          Surgery             General Surgery  

 

                                        



Sánchez Armendariz MD Strever, Michael Shane, RUMA             



                     2019          Anesthesia          General Surgery  



                                         Event   

 

                                        



Mandeep Burns MD                   



                     2019          Hospital            General Surgery  



                                         Encounter   



after 2019



Family History





   



                 Medical History  Relation        Name            Comments

 

   



                           Diabetes                  Mother  







   



                 Relation        Name            Status          Comments

 

   



                           Father                     

 

   



                           Mother                     







Social History





                                        Date



                 Tobacco Use     Types           Packs/Day       Years Used 

 

                                         



                 Former Smoker   Cigarettes      0.2             30 

 

    



                                         Smokeless Tobacco: Never   



                                         Used   







                    Drinks/Week         oz/Week             Comments



                                         Alcohol Use   

 

                                                            ex social smoker



                                         Not Currently   







 



                           Sex Assigned at Birth     Date Recorded

 

 



                                         Not on file 







                                        Industry



                           Job Start Date            Occupation 

 

                                        Not on file



                           Not on file               Not on file 







                                        Travel End



                           Travel History            Travel Start 

 





                                         No recent travel history available.







Last Filed Vital Signs





                    Reading             Time Taken          Comments



                                         Vital Sign   

 

                    170/84              2019  2:52 PM CDT  



                                         Blood Pressure   

 

                    70                  2019  2:52 PM CDT  



                                         Pulse   

 

                    36.4 C (97.5 F) 2019  1:35 PM CDT  



                                         Temperature   

 

                    16                  2019  2:52 PM CDT  



                                         Respiratory Rate   

 

                    97%                 2019  2:52 PM CDT  



                                         Oxygen Saturation   

 

                    -                   -                    



                                         Inhaled Oxygen   



                                         Concentration   

 

                    81.1 kg (178 lb 14.4 oz) 2019 12:00 PM CDT  



                                         Weight   

 

                    165.1 cm (5' 5")    2019 12:00 PM CDT  



                                         Height   

 

                    29.77               2019 12:00 PM CDT  



                                         Body Mass Index   







Plan of Treatment





   



                 Health Maintenance  Due Date        Last Done       Comments

 

   



                           COLONOSCOPY SCREENING     11/10/1997  

 

   



                           SHINGLES VACCINES (#1)    11/10/1997  

 

   



                     65+ PNEUMOCOCCAL VACCINE  11/10/2012          2007 



                                         (2 of 2 - PPSV23)   

 

   



                           INFLUENZA VACCINE         10/01/2020  







Procedures





                                        Comments



                 Procedure Name  Priority        Date/Time       Associated Diag

nosis 

 

                                         



                     EXTRACORPOREAL SHOCKWAVE   2019          KIDNEY STONE

 



                     LITHOTRIPSY (ESWL)   12:47 PM CDT        N20.0 

 

  



                                         Special



                                         Needs



                                         [facility]



                                         CONF #



                                         [id number]

 

                                        



Results for this procedure are in the results section.



                     POC GLUCOSE         Routine             2019  



                                         12:08 PM CDT  

 

                                        



Results for this procedure are in the results section.



                     XR ABDOMEN 1 VW     Routine             2019  



                                         11:41 AM CDT  



after 2019



Results

* POC glucose (2019 12:08 PM CDT)



    



              Component    Value        Ref Range    Performed At  Pathologist



                                         Signature

 

    



                 POC glucose     110 (H)         65 - 99 mg/dL   [facility] 



                           Comment:                  [facility] 



                           Meter ID: [id number]      [facility] 



                                         : Radha Foley   











                                         Specimen

 









   



                 Performing Organization  Address         City[facility]  Ph

one Number

 

   



                     [facility]  [address]    [place], 

[address] 



                                         PATHOLOGY AND GENOMIC   



                                         MEDICINE   

 

   



                     [facility]  [address] [place]    [place]

 [address] 



                                         [facility]   





* XR Abdomen 1 Vw (2019 11:41 AM CDT)







                                         Specimen

 









 



                           Narrative                 Performed At

 

 



                           EXAMINATION: XR ABDOMEN 1 VW  [facility]



                                         CLINICAL HISTORY: KUB for renal stone

 location preop 



                                         COMPARISON: 2019 



                                         IMPRESSION: 



                                         A couple of faint 3 mm calculi overlie 

the right lower renal pole. 



                                         The bowel gas pattern is unremarkable. 



                                         There is no significant skeletal abnorm

ality. 



                                         [id number] 







                                        Procedure Note

 

                                        



Hm Interface, Radiology Results Incoming - 2019 11:53 AM CDT



EXAMINATION:  XR ABDOMEN 1 VW



CLINICAL HISTORY:  KUB for renal stone location preop



COMPARISON:  2019



IMPRESSION:

A couple of faint 3 mm calculi overlie the right lower renal pole.

The bowel gas pattern is unremarkable.

There is no significant skeletal abnormality.



[id number]







   



                 Performing Organization  Address         City/[facility]  Ph

one Number

 

   



                      [facility]          [address] [place] [address] 





after 2019



Insurance





                                        Type



            Payer      Benefit    Subscriber ID  Effective  Phone      Address 



                           Plan /                    Dates   



                                         Group     

 

                                        PPO



                 [employer] MEDICARE  [employer]          xxxxxxxxx       2018-P   



                           MEDICARE                  resent   



                                         PPO/PFFS/E     



                                         [facility]     







     



            Guarantor Name  Account    Relation to  Date of    Phone      [place]illin

g Address



                     Type                Patient             Birth  

 

     



            Jose EduardoNavarroo DAWN  Personal/F  Self       1947  [phone]  [address]               (Home)              [place] [address]







Advance Directives





For more information, please contact: [phone]







                          Patient Representative    Explanation



                           Type                      Date Recorded  

 

                                                     



                           Advance Directives,       2019 11:23 AM  



                                         Living Will and   



                                         Medical Power of

## 2020-08-27 NOTE — XMS REPORT
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 1,000 mL        2018 23:26:00        No    

                             1,000 mL, 

Rate: 75 ml/hr, Infuse over: 13.3 hr, Route: IV, Dosing Weight 88.636 kg, Total 
Volume: 1,000, Start date: 18 18:26:00 CDT, Duration: 30 day, Stop date: 
18 18:25:00 CDT, 2.04, m2                                         [facility]

 

     Amarayl Amarayl           Yes            2         Daily           [facility]

 

     Avodart Avodart           Yes            5         Daily           [facility]

 

                          Fenofibrate Nanocrystallized (Fenofibrate) 145 Mg Tabl

et Fenofibrate 

Nanocrystallized (Fenofibrate) 145 Mg Tablet             Yes               145  

       Daily             [facility]

 

                          Lansoprazole (Prevacid Solutab) 30 Mg Tabdp Lansoprazo

le (Prevacid Solutab) 30 

Mg Tabdp             Yes               30          Daily             [facility]

 

     Lisinopril Lisinopril           Yes            5         Daily           [facility]

 

       Metformin Hcl 500 Mg Tablet Metformin Hcl 500 Mg Tablet               Yes

                  500           Twice 

A Day                                                       [facility]

 

       Minocycline Hcl 50 Mg Capsule Minocycline Hcl 50 Mg Capsule              

 Yes                  100           

Daily                                                       [facility]

 

     Neurontin Neurontin           Yes            600       Daily           [facility]

 

                          Solifenacin Succinate (Vesicare) 5 Mg Tablet Solifenac

in Succinate (Vesicare) 5 

Mg Tablet             Yes               5           Daily             [facility]

 

     Synthroid Synthroid           Yes            125       Daily           [facility]

 

        Tamsulosin Hcl 0.4 Mg Cap.er.24h Tamsulosin Hcl 0.4 Mg Cap.er.24h       

          Yes                     .4

                          Daily                                  [facility]

 

     Levitra , Levitra ,      2015 00:00:00 No                       Daily

           [facility]

 

     Metformin , Metformin ,      2015 00:00:00 No                       D

aily           [facility]

 

     Tricor , Tricor ,      2015 00:00:00 No                       Daily  

         [facility]







Vital Signs





             Vital Name   Observation Time Observation Value Comments     Source

 

             Systolic (mm Hg) 2020 19:21:00                           [facility]

 

             Diastolic (mm Hg) 2020 19:21:00                           [facility] [place]

 

             Heart Rate   2020 19:21:00                           [facility]

 

             Temperature Oral (F) 2020 19:21:00 98.1 F                    

[facility]

 

             Height       2020 19:21:00 165.1 cm                  [facility]

 [place]

 

             Weight       2020 19:21:00                           [facility]

 

             BMI Calculated 2020 19:21:00                           [facility]

 

             Systolic (mm Hg) 2019 17:05:00                           [facility] [place]

 

             Diastolic (mm Hg) 2019 17:05:00                           [facility] [place]

 

             Heart Rate   2019 17:05:00                           [facility]

 [place]

 

             Temperature Oral (F) 2019 17:05:00 98.0 F                    

[facility]

 

             Height       2019 17:05:00 165.1 cm                  [facility]

 [place]

 

             Weight       2019 17:05:00                           [facility]

 

             BMI Calculated 2019 17:05:00                           [facility] [place]

 

             Systolic blood pressure 2019 14:52:00 170 mm[Hg]             

   [facility]

 

             Diastolic blood pressure 2019 14:52:00 84 mm[Hg]             

    [facility]

 

             Heart rate   2019 14:52:00 70 /min                   [facility]

 

             Respiratory rate 2019 14:52:00 16 /min                   [facility]

 

                    Oxygen saturation in Arterial blood by Pulse oximetry  14:52:00 97 

/min                                                [facility]

 

             Body temperature 2019 13:35:00 36.39 Miranda                 [facility]

 

             Body height  2019 12:00:00 165.1 cm                  [facility] 

[facility]

 

             Body weight  2019 12:00:00 81.149 kg                 [facility]

 

             BMI          2019 12:00:00 29.77 kg/m2               [facility] 

[facility]

 

             Weight       2018-03-15 14:00:00                           [facility]

 

             Systolic (mm Hg) 2018-03-15 13:00:00                           [facility]

[facility] [place]

 

             Diastolic (mm Hg) 2018-03-15 13:00:00                           [facility]

 

             Temperature Oral (F) 2018-03-15 13:00:00 98.2 F                    

[facility] [place]

 

             Heart Rate   2018-03-15 13:00:00                           [facility]

 

             Respitory Rate 2018-03-15 13:00:00                           [facility]

 

             Heart Rate   2018-03-15 08:50:00                           [facility]

 [place]

 

             Respitory Rate 2018-03-15 08:50:00                           [facility] [place]

 

             Systolic (mm Hg) 2018-03-15 08:50:00                           [facility]

[facility] [place]

 

             Diastolic (mm Hg) 2018-03-15 08:50:00                           [facility]

 

             Temperature Oral (F) 2018-03-15 08:50:00 98.0 F                    

[facility]

 

             Temperature Oral (F) 2018-03-15 03:47:00 97.6 F                    

[facility]

 

             Respitory Rate 2018-03-15 03:47:00                           [facility] [place]

 

             Heart Rate   2018-03-15 03:47:00                           [facility]

 [place]

 

             Systolic (mm Hg) 2018-03-15 03:47:00                           [facility]

[facility] [place]

 

             Diastolic (mm Hg) 2018-03-15 03:47:00                           [facility]

 

             BMI Calculated 2018-03-15 03:40:00                           [facility]

 

             Weight       2018-03-15 03:40:00                           [facility]

 

             Height       2018-03-15 03:40:00 165.1 cm                  [facility]

 [place]

 

             Weight       2018 19:35:00                           [facility]

 

             BMI Calculated 2018 19:35:00                           [facility] [place]

 

             Height       2018 19:35:00 165.1 cm                  [facility]







Procedures





                Procedure       Date / Time Performed Performing Clinician Patrica jernigan

 

                    EXTRACORPOREAL SHOCKWAVE LITHOTRIPSY (ESWL) 2019 12:47

:00 Mandeep White

 

                POC GLUCOSE     2019 12:08:00 Mandeep White

[facility]

 

                XR ABDOMEN 1 VW 2019 11:41:25 Mandeep White

[facility]

 

                    Computed tomography of brain without radiopaque contrast 201

14 00:00:00 

ANDREY ONTIVEROS                        [facility]

 

                CT maxillofacial area wo contrast 2018 00:00:00 SANCHEZ ONTIEVROS [facility]

 

                    Computed tomography of cervical spine without contrast  00:00:00 

ANDREY ONTIVEROS                        [facility]







Plan of Care





             Planned Activity Planned Date Details      Comments     Source

 

                    Future Scheduled Test 2020-10-01 00:00:00 INFLUENZA VACCINE 

[code = INFLUENZA 

VACCINE]                                            [facility]

 

                    Future Scheduled Test 2020 00:00:00 INFLUENZA VACCINE 

(#1) [code = 

INFLUENZA VACCINE (#1)]                             [facility]

 

                    Future Scheduled Test 2012-11-10 00:00:00 PNEUMOCOCCAL 65+ L

OW/MEDIUM RISK (1 of

2 - PCV13) [code = PNEUMOCOCCAL 65+ LOW/MEDIUM RISK (1 of 2 - PCV13)]           

                [facility]

 

                    Future Scheduled Test 2012-11-10 00:00:00 65+ PNEUMOCOCCAL V

ACCINE (2 of 2 - 

PPSV23) [code = 65+ PNEUMOCOCCAL VACCINE (2 of 2 - PPSV23)]                     

      [facility]

 

                    Future Scheduled Test 1997-11-10 00:00:00 COLONOSCOPY SCREEN

ING [code = 

COLONOSCOPY SCREENING]                              [facility]

 

                    Future Scheduled Test 1997-11-10 00:00:00 SHINGLES VACCINES 

(#1) [code = 

SHINGLES VACCINES (#1)]                             [facility]

 

                    Future Scheduled Test 1947 00:00:00 Screening for jed

gnant neoplasm of 

colon (procedure) [code = 101184057]                           [facility]







Encounters





             Start Date/Time End Date/Time Encounter Type Admission Type Attendi

[facility]   Care Department Encounter ID    Source

 

        2020 14:33:56         Outpatient                 SE    SE    [id number]

[id number]    [facility]

 

           2020 08:30:00 2020 08:30:00 Outpatient            [facility]                [id number]         

 

           2020 13:00:00 2020 23:59:59 Outpatient            [facility]                [id number]         

 

           2020 05:58:00 2020 23:59:00 Outpatient            Warren navarrokami SE

                    SE                [id number]         

 

        2020-01-10 14:07:48 2020 23:59:59 Outpatient                 MG 

   [facility]    [id number]  

 

           2019 09:45:00 2019 23:59:59 Outpatient            [facility]                [id number]         

 

        2018 10:04:00 2018 23:59:59 Outpatient                 [facility] 

[id number]                             

 

           2018 11:02:00 2018 23:59:00 Outpatient            Myles Pelletier [facility]

                    [facility]              [id number]         

 

           2018 12:00:00 2018 12:00:00 Outpatient            VIS[facility], 

[facility] 

[facility]           [facility]           [id number]         

 

          2018 14:34:00 2018-03-15 10:25:00 Outpatient           Myles Murphy [facility]                     [id number]               

 

             2018 09:31:00 2018 13:37:00 Departed Emergency Room ER 

          LOVELY DEY           [facility]          [id number]    [facility]

 

           2018 09:17:00 2018 09:17:00 [facility]istered [facility]              [id number]        [facility]

 

           2017 08:29:00 2017 08:29:00 Registered Clinic ALEX         

[facility]              [id number]        [facility]

 

           2017 08:14:00 2017 08:14:00 Registered Clinic ALEX         

[facility]              [id number]        [facility]

 

        2017 07:54:00 2017 07:54:00 Registered Clinic               

  [facility]  

[id number]                            [facility]







Results





           Test Description Test Time  Test Comments Results    Result Comments 

Source

 

                CHEST SINGLE (PORTABLE) 2020 20:12:00                     

                              

                                                   [place] [address]      Patient Name: SELVIN PATEL                                MR 
#: [id number]                  : 1947                                  
Age/Sex: 72/M  Acct #: [id number]                              Req #: [id number]
[id number]  Adm Physician:                                                      
Ordered by: JERARDO CORDOBA MD                         Report #: [id number]
       Location: ER                                      Room/Bed:              
    
________________________________________________________________________________

___________________    Procedure: [id number] DX/CHEST SINGLE (PORTABLE)  Exam 
Date: 20                            Exam Time:                        
                      REPORT STATUS: Signed    EXAMINATION:  CHEST SINGLE 
(PORTABLE)          INDICATION: Fever      COMPARISON:  Chest x-ray on 
2020.           FINDINGS:          TUBES and LINES:  None.      LUNGS:  
Normal lung volumes. There is hazy opacification the bilateral lung   bases, 
right more to left.      PLEURA:  No pleural effusion or pneumothorax.      
HEART AND MEDIASTINUM:  The cardiomediastinal silhouette is unremarkable.       
  BONES AND SOFT TISSUES:  No acute osseous lesion.  Soft tissues are   
unremarkable.      UPPER ABDOMEN: No free air under the diaphragm.          
IMPRESSION:       Hazy opacification of bilateral lung bases, right more to left
which may   represent aspiration and/or developing multifocal pneumonia in the 
proper   clinical setting.      Signed by: Kandice Spring MD on 2020 8:23 PM
       Dictated By: KANDICE SPRING MD  Electronically Signed By: KANDICE SPRING MD on 20  Transcribed By: VALDEMAR on 20       COPY TO:   
JERARDO CORDOBA MD                                     

 

                CT ABDOMEN/PELVIS WO 2020 17:54:00                        

                              

                                                [place] [address]      Patient Name: SELVIN PATEL                                MR 
#: [id number]                  : 1947                                  
Age/Sex: 72/M  Acct #: [id number]                              Req #: [id number]
[id number]  Adm Physician:                                                      
Ordered by: Romulo Soriano MD                         Report #: [id number]  
     Location: ER                                      Room/Bed:                
  
________________________________________________________________________________

___________________    Procedure: [id number] CT/CT ABDOMEN/PELVIS WO  Exam Date: 
20                            Exam Time: 3295                             
                REPORT STATUS: Signed    EXAMINATION: CT of the abdomen and 
pelvis without contrast.       TECHNIQUE: Spiral CT images of the abdomen and 
pelvis were performed from the   lung bases to the lesser trochanters.  No 
intravenous contrast was given per   renal stone protocol..  Coronal and sagi
ttal reformatted images were obtained.      COMPARISON: Renal ultrasound 
2020      CLINICAL HISTORY:Abdominal pain, UTI symptoms and hematuria       
   DISCUSSION: ABSENCE OF INTRAVENOUS CONTRAST DECREASES SENSITIVITY FOR 
DETECTION   OF FOCAL LESIONS AND VASCULAR PATHOLOGY.      ABDOMEN/PELVIS:      
LOWER THORAX: Lung bases are grossly clear.       HEPATOBILIARY: Diffuse hepatic
steatosis. Hepatic size and contour are normal.   No focal lesions.  No intra or
extrahepatic biliary ductal dilation.      GALLBLADDER: No radio-opaque stones 
or sludge.  No wall thickening.      SPLEEN: No splenomegaly.      PANCREAS: No 
focal masses or ductal dilatation.      ADRENALS: No adrenal nodules.      
KIDNEYS/URETERS: No renal or ureteral calculi, hydronephrosis or obstruction.   
No contour abnormalities.   1.6 cm fluid density simple cyst in the left 
interpolar region (series 3, image   62).   Mild to moderate bilateral 
perinephric stranding.   No contour abnormalities.      PELVIC ORGANS/BLADDER: 
Moderate circumferential bladder wall thickening.   Prostate measures 4.8 x 3.4 
x 3.9 cm (estimated volume 33 mL), and contains   dystrophic calcifications. 
Seminal vesicles are unremarkable.      PERITONEUM/RETROPERITONEUM: No free air 
or fluid.      LYMPH NODES: No intra-abdominal,retroperitoneal, pelvic or 
inguinal   lymphadenopathy.      VESSELS: Mild atherosclerotic calcification of 
the distal abdominal aorta.      GI TRACT: No bowel dilation or evidence of 
obstruction.      BONES AND SOFT TISSUES: No aggressive lytic or suspicious 
focal sclerotic   lesions. Small bilateral fat-containing inguinal hernias, left
greater than   right. Soft tissues are otherwise unremarkable      IMPRESSION:  
       1. Moderate circumferential bladder wall thickening. This may be 
secondary to   bladder outlet obstruction from a mildly enlarged prostate, 
however, cystitis   is also a consideration.      2. Mild to moderate bilateral 
perinephric stranding. No renal, ureteral or   bladder calculi. No 
hydronephrosis or obstruction. Unable to evaluate for   pyelonephritis given the
lack of intravenous contrast.      3. Diffuse hepatic steatosis.      Signed by:
Dr. Rober Hastings M.D. on 2020 6:08 PM        Dictated By: ROBER HASTINGS MD  Electronically Signed By: ROBER HASTINGS MD on 20  
Transcribed By: VALDEMAR on 20       COPY TO:   ROMULO SORIANO MD 
                                                     

 

                CHEST SINGLE (PORTABLE) 2020 16:12:00                     

                              

                                                   [address] [address]      Patient Name: SELVIN PATEL                                MR 
#: [id number]                  : 1947                                  
Age/Sex: 72/M  Acct #: [id number]                              Req #: [id number]
[id number]  Adm Physician:                                                      
Ordered by: Romulo Soriano MD                         Report #: [id number]  
     Location: ER                                      Room/Bed:                
  
________________________________________________________________________________

___________________    Procedure: [id number] DX/CHEST SINGLE (PORTABLE)  Exam 
Date: 20                            Exam Time: 1549                       
                      REPORT STATUS: Signed    EXAMINATION:  CHEST SINGLE 
(PORTABLE)          INDICATION: Fever      COMPARISON: Chest radiograph 3/5/2019
          FINDINGS:      LINES/TUBES:None      LUNGS:The lungs are well-
inflated. No focal consolidation or pulmonary edema.      PLEURA:No pleural effu
faviola or pneumothorax.      MEDIASTINUM:The cardiomediastinal silhouette appears 
normal in size and shape.      BONES/SOFT TISSUES:No acute osseous injury. Right
proximal humerus anchors.      ABDOMEN:No free air under the diaphragm.         
IMPRESSION:    No focal pneumonia or pulmonary edema.      Signed by: Mg Billingsley MD on 2020 4:13 PM        Dictated By: MG BILLINGSLEY MD  Electronically 
Signed By: MG BILLINGSLEY MD on 20 1613  Transcribed By: VALDEMAR on 20 
1613       COPY TO:   ROMULO SORIANO MD                                     

 

                 RENAL RETROPERITONEAL COMP 2020 10:06:00               

                              

                                                         [address] [address]      Patient Name: SLEVIN PATEL                          
     MR #: [id number]                  : 1947                          
        Age/Sex: 72/M  Acct #: [id number]                              Req #: 
[id number]  Adm Physician:                                                      
Ordered by: MANDEEP WHITE MD                         Report #: [id number]      
 Location: US                                      Room/Bed:                   
________________________________________________________________________________

___________________    Procedure: [id number] US/US RENAL RETROPERITONEAL COMP  
Exam Date: 20                            Exam Time: 823                  
                           REPORT STATUS: Signed    EXAM: Renal Ultrasound   
INDICATION:            2020    NEOPLASM OF UNCERTAIN OF UNSPC'D 
KIDNEY     COMPARISON: CT abdomen and pelvis of 3/11/2019    TECHNIQUE: 
Transverse and longitudinal images of the kidneys and bladder were   obtained.  
     FINDINGS:           Right Kidney:    Length: 12.7 cm   Appearance: Normal 
echogenicity.     Collecting system: No hydronephrosis   Stones: None   
Cyst/Mass: None      Left Kidney:    Length: 12.3 cm   Appearance: Normal 
echogenicity.     Collecting system: No hydronephrosis   Stones: None   
Cyst/Mass: Upper pole 1.4 cm anechoic simple cyst.      Bladder:    No mass or 
calculi. Bilateral ureteral jets visualized. Prevoid volume estimate   of 348 
cc.      Prostate volume estimate of 40.2 cc.      IMPRESSION:   Left upper pole
simple cyst.      No hydronephrosis or renal calculi.      Signed by: Mg Billingsley MD on 2020 10:08 AM        Dictated By: MG BILLINGSLEY MD  Electronically 
Signed By: MG BILLINGSLEY MD on 20 1008  Transcribed By: VALDEMAR on 20 
1008       COPY TO:   MANDEEP WHITE MD                                     

 

           CHEM PANEL 2020 14:19:00            0.9                   [facility]

 

           CHEM PANEL 2020 14:19:00            85                    [facility] S[facility]iqui

 

                ABDOMEN-1VIEW (KUB) 2019-10-10 10:08:00                         

                              

                                               [place] [address]
     Patient Name: SELVIN PATEL                                   MR #: 
[id number]                     : 1947                                  
Age/Sex: 71/M  Acct #: [id number]                              Req #: [id number]
[id number]  [facility] Physician:                                                      
Ordered by: MANDEEP WHITE MD                            Report #: [id number]   
    Location: [facility]                                     Room/Bed:                 
   
________________________________________________________________________________

___________________    Procedure: [id number] DX/ABDOMEN-1VIEW (KUB)  Exam Date: 
10/10/19                            Exam Time: 910                             
                REPORT STATUS: Signed       Abdomen, 1 view.         History: 
Kidney stones.      Comparison: 2019.      Findings: Air is scattered 
throughout nondilated small and large bowel. There   are no masses. The 
previously seen small calcification projected over the right   renal lower pole 
is not seen on today's exam. The osseous structures are   intact.         
IMPRESSION:   Non-specific bowel gas pattern.      Signed by: Andrey Barone on 
10/10/2019 10:10 AM        Dictated By: ANDREY BARONE MD  Electronically Signed 
By: ANDREY BARONE MD on 10/10/19 1010  Transcribed By: VALDEMAR on 10/10/19 1010 
     COPY TO:   MANDEEP WHITE MD                                     

 

                    POC glucose         2019 12:15:21   

 

                                        Test Item

 

             POC glucose (test code = 60703-7) 110 mg/dL    65-99        H      

      Meter ID: 

[id number]: Radha Alaina 

 

             Lab Interpretation (test code = 16838-1) Abnormal                  

              





[facility] [id number] 11:50:22Hm Interface, Radiology 
Results  - 2019 11:53 AM CDTEXAMINATION:  XR ABDOMEN 1 VWCLINICAL 
HISTORY:  KUB for renal stone location preopCOMPARISON:  2019IMPRESSION:A 
couple of faint 3 mm calculi overlie the right lower renal pole.The bowel gas 
pattern is unremarkable.There is no significant skeletal abn
ormality.[id number] MethodistLACTIC ACID [id number] 12:50:00* 



             Test Item    Value        Reference Range Interpretation Comments

 

             LACTIC ACID POC (test code = LACTP) 2.20 mmol/L  0.7-2.0      HH   

         





[id number] 11:01:00* 



             Test Item    Value        Reference Range Interpretation Comments

 

             POTASSIUM (test code = K) 5.0 mmol/L   3.4-5.0      N             





HGB   [id number] 10:53:00* 



             Test Item    Value        Reference Range Interpretation Comments

 

             HEMOGLOBIN (test code = HGB) 11.8 g/dL    14.0-18.0    L           

  

 

             HEMATOCRIT (test code = HCT) 37.1 %       37.0-49.0    N           

  





- CT ABD PELVIS W/[id number] 10:12:00 FAX:        Beulah Walton MD       
[phone]    [place]:   St: REG----------
---------------------------------------------------------------------  Name:  THONY
SWATISELVIN                     [facility]                     : 11/10/194
7   Age/S: 71/M           [address]                  Unit: [id number]      
Loc: [place] [address]                Phys:  Beulah Walton MD     
                                              Acct:  [id number] Dis Date:     
         Status: REG ER                                  PHONE #: [phone]  
  Exam Date: 2019 1000                        FAX #: [phone]     
Reason: llq pain                                            EXAMS:              
                                CPT CODE:      [id number] CT ABD PELVIS W/CONT   
                   [address]                    EXAM:  - CT ABD PELVIS W/CONT       
       LOCATION: C3               INDICATION: 71 years -old Male with llq pain  
            TECHNIQUE: Contrast -        IV contrast was given. No oral contrast
was given        Portal venous phase - abdomen and pelvis       Delayed images 
through the abdomen.       Reconstructions - coronal and sagittal planes        
      This exam was performed according to our departmental       dose-opti
mization program, which includes automated exposure control,       adjustment of
the mA and/or kV according to patient size and/or use of       iterative recons
truction technique               COMPARISON: None               FINDINGS:       
Statements: None.               Thoracic: Included images of the lower chest dem
onstrate no       abnormalities.               Hepatobiliary: Liver demonstrates
diffuse decrease in attenuation       without focal lesion. The gallbladder is 
normal. No biliary dilation.               Pancreas: Normal.               Splee
n: Normal.                Adrenals: Normal.               Genitourinary: 1.2 cm 
left renal cyst noted.  No hydronephrosis.        Evaluation of the bladder is l
imited, but no obvious bladder       abnormality is present.               Gastr
ointestinal: Mild inflammation identified at the proximal aspect       of the si
gmoid in this patient with diverticula. The appendix is not       visualized.   
           Vascular: Atherosclerotic calcifications are seen within the aorta a
nd       branch vessels.      PAGE  1                       Signed Report       
            (CONTINUED)  FAX:        Beulah Walton MD       [phone]    [place]
us:   St: REG-----------------------------------------------------------------
--------------  Name:  SELVIN PATEL                     [facility]          
          : 1947   Age/S: 71/M           [address]                
 Unit: [id number]       Loc: [place] [address]                P
hys:  Beulah Walton MD                                                    Acct:  
[id number] Dis Date:               Status: REG ER                             
    PHONE #: [phone]     Exam Date: 2019 1000                        
FAX #: [phone]     Reason: llq pain                                        
   EXAMS:                                               CPT CODE:      [id number]
CT ABD PELVIS W/CONT                       [address]               <Continued>      
         Lymphatics: No enlarged lymph nodes by CT size criteria.               
Bones/Soft Tissues: No acute osseous findings. No ventral hernias.              
Peritoneum/Other: No extraluminal air. No extraluminal fluid.                 
IMPRESSION:                   Uncomplicated mild sigmoid diverticulitis.        
          Hepatic steatosis.          ** Electronically Signed by Yuval Patterson MD on 2019 at 1012 **                      Reported and signed by: Yuval Patterson MD                         CC: Beulah Walton MD                           
                                                                                
             Technologist: Jazzmine Hines; MANSOOR FOX                          
 Trnscrd Dt/Tm: 2019 (1012) t.SDR.HV2                          Orig Print 
D/T: S: 2019 (1015                              PAGE  2                   
   Signed Report                               BASIC METABOLIC [id number] 
09:57:00* 



             Test Item    Value        Reference Range Interpretation Comments

 

             SODIUM (test code = NA) 134 mmol/L   137-145      L             

 

             POTASSIUM (test code = K) 5.6 mmol/L   3.4-5.0      H            IS

 THE SAMPLE 

HEMOLYZED?:NHEMOLYSIS GRADE:

 

             CHLORIDE (test code = CL) 98 mmol/L           N             

 

             CARBON DIOXIDE (test code = CO2) 22 mmol/L    22-30        N       

      

 

             GLUCOSE (test code = GLU) 331 mg/dL           H             

 

             BLOOD UREA NITROGEN (test code = BUN) 26 mg/dL     9-20         H  

           

 

             GLOMERULAR FILTRATION RATE (test code = GFR) 63           >60      

                 The estimated glomerular 

filtration rate is computed usingpatient race, age (>18), sex, and serum 
creatinine. If anyof the needed data elements are missing the Laboratory cannot 
compute an estimation of the glomerular filtration rate.

 

             CREATININE (test code = CREAT) 1.2 mg/dL    0.7-1.3      N         

    

 

             CALCIUM (test code = CA) 8.8 mg/dL    8.4-10.2     N             





LIVER FUNCTION [id number] 09:57:00* 



             Test Item    Value        Reference Range Interpretation Comments

 

             TOTAL PROTEIN (test code = PROT) 6.8 g/dL     6.3-8.2      N       

      

 

             ALBUMIN (test code = ALB) 4.1 g/dL     3.5-5.0      N             

 

             BILIRUBIN TOTAL (test code = BILT) 0.3 mg/dL    0.2-1.3      N     

        

 

             BILIRUBIN CONJUGATED (test code = BILCON) 0 mg/dL      0-0.3       

 N            

~~~~~~~~~~~~~~~~~~~~~~~~~~~~~~~~~~~~~~~~~~~~~~~~~~~~~~~~~~~~CONJUGATED BILIRUBIN
IS THE REPLACEMENT ASSAY FOR 
DIRECTBILIRUBIN.~~~~~~~~~~~~~~~~~~~~~~~~~~~~~~~~~~~~~~~~~~~~~~~~~~~~~~~~~~~~

 

             BILIRUBIN UNCONJUGATED (test code = BILUNC) 0.2 mg/dL    0-1.1     

   N             

 

             SGOT/AST (test code = AST) 17 U/L       15-46        N             

 

             SGPT/ALT (test code = ALT) 19 U/L       13-69        N             

 

             ALKALINE PHOSPHATASE (test code = ALKP) 38 U/L              N

             





[id number] 09:57:00* 



             Test Item    Value        Reference Range Interpretation Comments

 

             LIPASE (test code = LIP) 255 U/L             N             





BEDSIDE [id number] 09:53:00* 



             Test Item    Value        Reference Range Interpretation Comments

 

             BEDSIDE CREATININE (test code = CREATBED) 1.2 mg/dL    0.66-1.25   

 N             





PROTHROMBIN [id number] 09:52:00* 



             Test Item    Value        Reference Range Interpretation Comments

 

             PROTHROMBIN TIME PATIENT (test code = PTP) 12.2 SECONDS 9.2-12.1   

  H             

 

             INTERNATIONAL NORMAL RATIO (test code = INR) 1.1                   

                 The INR is to be used only 

for monitoring ORAL ANTICOAGULANTTHERAPY.         Indication                    
            INR Value1.  Prophylaxis/treatment of:                            
Venous Thrombosis, Pulmonary Embolism        2.0 - 3.02.  Prevention of systemic
embolism from:      Tissue heart valves                          2.0 - 3.0      
Acute myocardial infarction (to present      systemic embolism)*                
         2.0 - 3.0      Valvular heart disease                       2.0 - 3.0  
   Atrial fibrillation                          2.0 - 3.03.  Mechanical 
prosthetic valves (high risk)       2.5 - 3.5 * If oral anticoagulant therapy is
elected to preventrecurrent myocardial infarction, an INR of 2.5-3.5 
isrecommended, consistent with Food and Drug Administrationrecommendations.





THROMBOPLASTIN TIME [id number] 09:52:00* 



             Test Item    Value        Reference Range Interpretation Comments

 

             THROMBOPLASTIN TIME PARTIAL (test code = PTT) 22.4 SECONDS 23.4-37.

0    L              

Therapeutic Range for Heparin EFFECTIVE 7/10/13 Heparin IU/mL                   
aPTT Seconds0.3                              64.30.7                            
 88.8





CBC W/AUTO [id number] 09:44:00* 



             Test Item    Value        Reference Range Interpretation Comments

 

             WHITE BLOOD CELL (test code = WBC) 9.5 x10 3/uL 5.0-12.0     N     

        

 

             RED BLOOD CELL (test code = RBC) 5.09 x10 6/uL 4.70-6.10    N      

       

 

             HEMOGLOBIN (test code = HGB) 12.4 g/dL    14.0-18.0    L           

  

 

             HEMATOCRIT (test code = HCT) 40.0 %       37.0-49.0    N           

  

 

             MEAN CELL VOLUME (test code = MCV) 79 fL        80-94        L     

        

 

             MEAN CELL HGB (test code = MCH) 24.4 pg      27-31        L        

     

 

             MEAN CELL HGB CONCENTRATION (test code = MCHC) 31.0 g/dL    33-37  

      L             

 

             RED CELL DISTRIBUTION WIDTH (test code = RDW) 14.8 %       11.5-15.

5    N             

 

             PLATELET COUNT (test code = PLT) 288 x10 3/uL 130-400      N       

      

 

             MEAN PLATELET VOLUME (test code = MPV) 10.4 fL      9.4-16.4     N 

            

 

             NEUTROPHIL % (test code = NT%) 73.4 %       43-65        H         

    

 

             IMMATURE GRANULOCYTE % (test code = IG%) 0.6 %        0.0-2.0      

N             

 

             LYMPHOCYTE % (test code = LY%) 19.3 %       20.5-45.5    L         

    

 

             MONOCYTE % (test code = MO%) 4.7 %        5.5-11.7     L           

  

 

             EOSINOPHIL % (test code = EO%) 1.8 %        0.9-2.9      N         

    

 

             BASOPHIL % (test code = BA%) 0.2 %        0.2-1.0      N           

  

 

             NUCLEATED RBC % (test code = NRBC%) 0.0 %        0-1.0        N    

         

 

             NEUTROPHIL # (test code = NT#) 6.96 x10 3/uL 2.2-4.8      H        

     

 

             IMMATURE GRANULOCYTE # (test code = IG#) 0.06 x10 3/uL 0-0.03      

 H             

 

             LYMPHOCYTE # (test code = LY#) 1.83 x10 3/uL 1.3-2.9      N        

     

 

             MONOCYTE # (test code = MO#) 0.45 x10 3/uL 0.3-0.8      N          

   

 

             EOSINOPHIL # (test code = EO#) 0.17 x10 3/uL 0.0-0.2      N        

     

 

             BASOPHIL # (test code = BA#) 0.02 x10 3/uL 0.0-0.1      N          

   





ABDOMEN-1VIEW (KUB)2019 09:21:00                                          
                                           [place] [address]    
 Patient Name: SELVIN PATEL                                   MR #: 
[id number]                     : 1947                                  
Age/Sex: 71/M  Acct #: [id number]                              Req #: [id number]
[id number]  Adm Physician:                                                      
Ordered by: MANDEEP WHITE MD                            Report #: [id number]   
    Location: RAD                                     Room/Bed:                 
   ______________________________________________
_____________________________________________________    Procedure: [id number] DX
/ABDOMEN-1VIEW (KUB)  Exam Date: 19                            Exam Time: 
0850                                              REPORT STATUS: Signed    Exam:
KUB - 2 views      Clinical History: Renal calculus.      Comparison: CT abdome
n/pelvis 3/11/2019.      Findings:    There is a 5 mm calcification overlying th
e right lower pole kidney. No   evidence of calcification overlying the left kid
karla or expected course of the   ureters. There are prostatic calcifications.    
 Nonobstructive bowel gas pattern. Moderate amount of stool in the colon.      
No acute bony abnormality.      Impression:   A 5 mm calcification overlying the
right lower pole kidney, corresponding to   stone seen on prior CT from 3/11/20
19.      Signed by: Dr. Ruma Méndez MD on 2019 9:25 AM        Dictated By: 
RUMA MÉNDEZ MD  Electronically Signed By: RUMA MÉNDEZ MD on 19  Transcr
ibed By: VALDEMAR on 19       COPY TO:   MANDEEP WHITE MD         CT 
ABDOMEN/PELVIS [id number] 10:12:00                                           
                                          [place] [address]     
Patient Name: SELVIN PATEL                                   MR #: [id number]
                    : 1947                                   Age/Sex: 
71/M  Acct #: [id number]                              Req #: [id number]  Adm 
Physician:                                                      Ordered by: 
MANDEEP WHITE MD                            Report #: [id number]        
Location: CT                                      Room/Bed:                     
______________________________________________
_____________________________________________________    Procedure: [id number] CT
/CT ABDOMEN/PELVIS WOW  Exam Date: 19                            Exam Time
: 0850                                              REPORT STATUS: Signed    EXA
M: CT Abdomen and Pelvis WITHOUT and WITH contrast     INDICATION: Renal neoplas
m   COMPARISON: CT abdomen/pelvis, 2019   TECHNIQUE:  Abdomen and pelvis we
re scanned utilizing a multidetector helical   scanner from the lung base to the
pubic symphysis before and after   administration of IV contrast. Coronal and s
agittal reformations were obtained.   Imaging was obtained precontrast, arterial
phase, venous phase and delayed   phase. Renal mass protocol was performed, with
abdomen and pelvis scanning on   delayed imaging.      Dose modulation, iterat
jonathan reconstruction, and/or weight based adjustment of   the mA/kV was utilized t
o reduce the radiation dose to as low as reasonably   achievable.               
   IV CONTRAST: 100 mL of Isovue-370               ORAL CONTRAST: 900 cc water  
            RADIATION DOSE: Total DLP: 1803.90 mGy*cm                Estimated 
effective dose: (DLP x 0.015 x size factor) mSv               COMPLICATIONS: No
ne      FINDINGS:      LINES and TUBES: None.      LOWER THORAX:  Lung bases collette
ar. Heart size normal.      HEPATOBILIARY: Diffuse low density hepatic parenchym
a compatible with   steatosis.  No focal hepatic lesions. No biliary ductal dila
tion.       GALLBLADDER: No radio-opaque stones or sludge.  No wall thickening. 
    SPLEEN: No splenomegaly.       PANCREAS: No focal masses or ductal dilatati
on.        ADRENALS: No adrenal nodules          KIDNEYS/URETERS: Kidneys enhanc
e symmetrically.  No hydronephrosis or ureteral   dilatation no filling defects 
in opacified collecting systems. There is a well   marginated 1.1 cm peripheral 
low density mass in the lateral upper aspect of   the left kidney. This is best 
seen on postcontrast imaging. The internal   density measures under 10 HU on eac
h phase of the examination with no evidence   for enhancement.  Again noted is a
5 mm calcification in the lower pole right   collecting system compatible with 
nonobstructing intrarenal calculus. There is   very mild perinephric stranding b
ilaterally.      GI TRACT: No abnormal distention, wall thickening, or evidence 
of bowel   obstruction.  There are scattered colonic diverticula with no CT evid
ence for   acute diverticulitis.  The appendix is not conspicuously visualized b
ut there   is no right lower quadrant inflammation to suggest appendicitis.     
PELVIC ORGANS/BLADDER: Partially opacified urinary bladder unremarkable. No   d
iscrete abnormal mass or fluid collection in the pelvis.      LYMPH NODES: No do
minant lymph node mass is seen in the abdomen,   retroperitoneum or pelvis.     
VESSELS: There are scattered calcified plaques along the abdominal aorta. No   
abdominal aortic aneurysm or dissection. Renal arteries are patent with an   acc
essory left renal artery noted.  IVC unremarkable. Portal system is patent,   on
ly partially included on the venous phase which is targeted at the kidneys.     
PERITONEUM / RETROPERITONEUM: No pneumoperitoneum or ascites.      BONES: No ac
jose or suspicious bony lesions.      SOFT TISSUES: Superficial surrounding soft 
tissue unremarkable.   Small left   inguinal hernia containing fat.            I
MPRESSION:    1.  1.1 cm hypodensity in the lateral upper pole of the left kidne
y is cyst   density, well marginated and shows no evidence for enhancement. This
is   compatible with a small cortical cyst.      2.  Again noted is a 5 mm nono
bstructing intrarenal calculus the lower pole of   the right collecting system. 
    3.  No other acute finding or significant change from previous CT.      Sig
mary by: Dr. Robert Nolasco M.D. on 3/11/2019 10:37 AM        Dictated By: ERICK NOLASCO MD  Electronically Signed By: ROBERT NOLASCO MD on 19 1037  Transcr
ibed By: VALDEMAR on 19 1037       COPY TO:   MANDEEP WHITE MD          
[id number] 17:21:00                                                      
                               [place] [address]      Patient 
Name: SELVIN PATEL                                   MR #: [id number]        
            : 1947                                   Age/Sex: 71/M  
Acct #: [id number]                              Req #: [id number]  Adm 
Physician:                                                      Ordered by: 
KARLEE BANUELOS M.D.                            Report #: [id number]        
Location: US                                      Room/Bed:                     
___________________________________________
________________________________________________________    Procedure: [id number]
US/US THYROID  Exam Date: 19                            Exam Time: 1348   
                                          REPORT STATUS: Signed    EXAM: Thyroid
Ultrasound   INDICATION:         THYROID CANCER. Thyroidectomy .    JATINDER
RISON: None    TECHNIQUE: Transverse and sagittal images were obtained of the th
yroid bed.       FINDINGS:      Thyroid bed:   Status post thyroidectomy. No res
idual thyroid tissue or recurrent thyroid   tissue is noted in the thyroid bed b
ilaterally.        Lymph nodes:   No suspicious appearing lymph nodes.        IM
PRESSION:      Status post thyroidectomy. No evidence to suggest residual or rec
urrent   disease.      Signed by: Dr. Joselito Dunlap M.D. on 3/5/2019 5:22 PM        D
ictated By: JOSELITO DUNLAP MD  Electronically Signed By: JOSELITO DUNLAP MD on 19  T
ranscribed By: VALDEMAR on 19       COPY TO:   KARLEE BANUELOS M.D.   
     [facility] 2 [id number] 15:13:00                                           
                                          [place] [address]     
Patient Name: SELVIN PATEL                                   MR #: [id number]
                    : 1947                                   Age/Sex: 
71/M  Acct #: [id number]                              Req #: [id number]  Adm 
Physician:                                                      Ordered by: 
KARLEE BANUELOS M.D.                            Report #: [id number]        
Location: US                                      Room/Bed:                     
___________________________________________
________________________________________________________    Procedure: [id number]
DX/CHEST 2 VIEWS  Exam Date: 19                            Exam Time: 1340
                                             REPORT STATUS: Signed       EXAMI
NATION: PA and lateral views of the chest.      COMPARISON: None      CLINICAL H
ISTORY:  Thyroid cancer           DISCUSSION:      Lines/tubes:  None.      Lung
s:  The lungs are well inflated and clear. No pneumonia or pulmonary edema.     
Pleura:  No pleural effusion or pneumothorax.      Heart and mediastinum:  The 
cardiomediastinal silhouette is normal.      Bones and soft tissues:  No acute b
damari abnormalities.           IMPRESSION:       No acute cardiopulmonary abnormal
ities.                  Signed by: Dr. Andrew Palisch, M.D. on 3/5/2019 3:14 PM 
      Dictated By: ANDREW R PALISCH MD  Electronically Signed By: CHI ÁLVAREZ MD on 19  Transcribed By: VALDEMAR on 19       COPY TO:
  KARLEE BANUELOS M.D.         CT ABDOMEN/PELVIS [id number] 10:30:00          
                                                                           [place] [address]      Patient Name: SELVIN PATEL          
                        MR #: [id number]                     : 1947    
                              Age/Sex: 71/M  Acct #: [id number]               
              Req #: [id number]  Adm Physician:                                 
                    Ordered by: MANDEEP WHITE MD                            
Report #: [id number]        Location: CT                                      
Room/Bed:                     ______________________________________________
_____________________________________________________    Procedure: [id number] CT
/CT ABDOMEN/PELVIS WO  Exam Date: 19                            Exam Time:
0910                                              REPORT STATUS: Signed    EXAM:
CT ABDOMEN AND PELVIS without IV CONTRAST   DATE: 2019 Time stamp on Exam: 
9:37 AM   INDICATION:  Hematuria   COMPARISON:  None    TECHNIQUE: The abdomen 
and pelvis were scanned using a multidetector helical   scanner. Coronal and sa
gittal reformations were obtained. Routine protocol   performed.      Low-dose t
echnique was utilized.      IV Contrast: None   Oral Contrast: None   Radiation 
Dose: Total .08 mGy*cm   Estimated effective dose: DLP x 0.015 x size fac
tor      FINDINGS:   LOWER THORAX: No consolidations      LIVER: No masses with 
diffuse fatty infiltration of the liver.   BILIARY: The gallbladder is unremarka
ble.  No ductal dilatation.      SPLEEN: No masses   PANCREAS: No masses      AD
RENALS: No nodules   KIDNEYS: There are 2 small right lower pole renal stones wi
th a composite   measurement of 6 mm. No evidence of hydronephrosis. Contour abn
ormality of the   left upper interpolar region with subtle low density may repre
sent a cyst or a   mass. In a patient with gross hematuria CT renal mass protoco
l follow-up would   be of benefit. Nonspecific bilateral perinephric fat strandi
ng.      GI TRACT: No distention, wall thickening or evidence of obstruction.   
      VESSELS: Vascular calcification.   PERITONEUM/RETROPERITONEUM: No free air
or fluid   LYMPH NODES: No lymphadenopathy      REPRODUCTIVE ORGANS: Prostate 
calcification.   BLADDER: Unremarkable      SOFT TISSUES: Unremarkable   BONES: 
No suspicious bone lesions. Mild degenerative changes of the spine.      IMPRESS
ION:   1. Two small nonobstructing right lower pole renal stones.   2. Subtle le
ft lateral renal interpolar contour abnormality with focal   hypodensity.   3. F
ollow-up renal mass protocol is recommended.   4. Diffuse fatty infiltration of 
the liver.      Signed by: Dr. Jose Maria Meza DO on 2019 10:43 AM        Di
ctated By: JOSE MARIA MEZA DO  Electronically Signed By: JOSE MARIA MEZA DO on  1043  Transcribed By: VALDEMAR on 19 1043       COPY TO:   RAVINDRA WHITE RD, MD         ABDOMEN-1VIEW (KUB)2018 09:41:00                            
                                                         [place] [address]      Patient Name: SELVIN PATEL                        
          MR #: [id number]                     : 1947                  
                Age/Sex: 71/M  Acct #: [id number]                             
Req #: [id number]  Adm Physician:                                               
      Ordered by: MANDEEP WHITE MD                            Report #: [id number]
[id number]        Location: RAD                                     Room/Bed:         
           ______________________________________________
_____________________________________________________    Procedure: [id number] DX
/ABDOMEN-1VIEW (KUB)  Exam Date: 18                            Exam Time: 
0852                                              REPORT STATUS: Signed    PROCE
DURE:   X-RAY ABDOMEN - KUB       COMPARISON:   KUB 18.       INDICATIONS: 
 CALCULUS OF KIDNEY       FINDINGS:      Again noted are right lower pole renal 
stones measuring up to 5 mm. No    evidence of calcifications overlying the left
kidney or the expected    course of bilateral ureters.        There is a non-o
bstructed bowel-gas pattern. There are no acute osseous    abnormalities. The ramos
ng bases are clear.       CONCLUSION:      Similar appearance of right lower gwendolyn
e renal stones measuring up to 5    mm.       Dictated by:  RUMA MÉNDEZ M.D. on 
2018 at 9:41        Electronically approved by:

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
## 2020-08-27 NOTE — XMS REPORT
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    

                             1,000 mL, 

Rate: 75 ml/hr, Infuse over: 13.3 hr, Route: IV, Dosing Weight 88.636 kg, Total 
Volume: 1,000, Start date: 18 18:26:00 CDT, Duration: 30 day, Stop date: 
18 18:25:00 CDT, 2.04, m2                                         [facility]

 

     Amarayl Amarayl           Yes            2         Daily           [facility]

 

     Avodart Avodart           Yes            5         Daily           [facility]

 

                          Fenofibrate Nanocrystallized (Fenofibrate) 145 Mg Tabl

et Fenofibrate 

Nanocrystallized (Fenofibrate) 145 Mg Tablet             Yes               145  

       Daily             [facility]

 

                          Lansoprazole (Prevacid Solutab) 30 Mg Tabdp Lansoprazo

le (Prevacid Solutab) 30 

Mg Tabdp             Yes               30          Daily             [facility]

 

     Lisinopril Lisinopril           Yes            5         Daily           [facility]

 

       Metformin Hcl 500 Mg Tablet Metformin Hcl 500 Mg Tablet               Yes

                  500           Twice 

A Day                                                       [facility]

 

       Minocycline Hcl 50 Mg Capsule Minocycline Hcl 50 Mg Capsule              

 Yes                  100           

Daily                                                       [facility]

 

     Neurontin Neurontin           Yes            600       Daily           [facility]

 

                          Solifenacin Succinate (Vesicare) 5 Mg Tablet Solifenac

in Succinate (Vesicare) 5 

Mg Tablet             Yes               5           Daily             [facility]

 

     Synthroid Synthroid           Yes            125       Daily           [facility]

 

        Tamsulosin Hcl 0.4 Mg Cap.er.24h Tamsulosin Hcl 0.4 Mg Cap.er.24h       

          Yes                     .4

                          Daily                                  [facility]

 

     Levitra , Levitra ,      2015 00:00:00 No                       Daily

           [facility]

 

     Metformin , Metformin ,      2015 00:00:00 No                       D

aily           [facility]

 

     Tricor , Tricor ,      2015 00:00:00 No                       Daily  

         [facility]







Vital Signs





             Vital Name   Observation Time Observation Value Comments     Source

 

             Systolic (mm Hg) 2020 19:21:00                           [facility]

 

             Diastolic (mm Hg) 2020 19:21:00                           [facility] [place]

 

             Heart Rate   2020 19:21:00                           [facility]

 

             Temperature Oral (F) 2020 19:21:00 98.1 F                    

[facility]

 

             Height       2020 19:21:00 165.1 cm                  [facility]

 

             Weight       2020 19:21:00                           [facility]

 

             BMI Calculated 2020 19:21:00                           [facility]

 

             Systolic (mm Hg) 2019 17:05:00                           [facility] [place]

 

             Diastolic (mm Hg) 2019 17:05:00                           [facility]

[facility]

 

             Heart Rate   2019 17:05:00                           [facility]

 

             Temperature Oral (F) 2019 17:05:00 98.0 F                    

[facility] [place]

 

             Height       2019 17:05:00 165.1 cm                  [facility]

 [place]

 

             Weight       2019 17:05:00                           [facility]

 [place]

 

             BMI Calculated 2019 17:05:00                           [facility]

[facility]

 

             Systolic blood pressure 2019 14:52:00 170 mm[Hg]             

   [facility]

 

             Diastolic blood pressure 2019 14:52:00 84 mm[Hg]             

    [facility]

 

             Heart rate   2019 14:52:00 70 /min                   [facility]

 

             Respiratory rate 2019 14:52:00 16 /min                   [facility]

 

                    Oxygen saturation in Arterial blood by Pulse oximetry  14:52:00 97 

/min                                                [facility]

 

             Body temperature 2019 13:35:00 36.39 Miranda                 [facility]

 

             Body height  2019 12:00:00 165.1 cm                  [facility]

 

             Body weight  2019 12:00:00 81.149 kg                 [facility]

 

             BMI          2019 12:00:00 29.77 kg/m2               [facility]

 

             Weight       2018-03-15 14:00:00                           [facility]

 

             Systolic (mm Hg) 2018-03-15 13:00:00                           [facility]

 

             Diastolic (mm Hg) 2018-03-15 13:00:00                           [facility]

[facility]

 

             Temperature Oral (F) 2018-03-15 13:00:00 98.2 F                    

[facility]

 

             Heart Rate   2018-03-15 13:00:00                           [facility]

 [place]

 

             Respitory Rate 2018-03-15 13:00:00                           [facility]

[facility] [place]

 

             Heart Rate   2018-03-15 08:50:00                           [facility]

 

             Respitory Rate 2018-03-15 08:50:00                           [facility]

[facility]

 

             Systolic (mm Hg) 2018-03-15 08:50:00                           [facility]

[facility]

 

             Diastolic (mm Hg) 2018-03-15 08:50:00                           [facility]

[facility] [place]

 

             Temperature Oral (F) 2018-03-15 08:50:00 98.0 F                    

[facility]

 

             Temperature Oral (F) 2018-03-15 03:47:00 97.6 F                    

[facility]

 

             Respitory Rate 2018-03-15 03:47:00                           [facility]

[facility] [place]

 

             Heart Rate   2018-03-15 03:47:00                           [facility]

 [place]

 

             Systolic (mm Hg) 2018-03-15 03:47:00                           [facility]

 

             Diastolic (mm Hg) 2018-03-15 03:47:00                           [facility]

[facility] [place]

 

             BMI Calculated 2018-03-15 03:40:00                           [facility]

[facility]

 

             Weight       2018-03-15 03:40:00                           [facility]

 [place]

 

             Height       2018-03-15 03:40:00 165.1 cm                  [facility]

 [place]

 

             Weight       2018 19:35:00                           [facility]

 [facility]

 

             BMI Calculated 2018 19:35:00                           [facility]

[facility]

 

             Height       2018 19:35:00 165.1 cm                  [facility]







Procedures





                Procedure       Date / Time Performed Performing Clinician [facility]

e

 

                    EXTRACORPOREAL SHOCKWAVE LITHOTRIPSY (ESWL) 2019 12:47

:00 Mandeep White

 

                POC GLUCOSE     2019 12:08:00 Mandeep White

[facility]

 

                XR ABDOMEN 1 VW 2019 11:41:25 Mandeep White

[facility]

 

                    Computed tomography of brain without radiopaque contrast 201

14 00:00:00 

ANDREY ONTIVEROS                        [facility]

 

                CT maxillofacial area wo contrast 2018 00:00:00 SANCHEZ ONTIVEROS [facility]

 

                    Computed tomography of cervical spine without contrast  00:00:00 

ANDREY ONTIVEROS                        [facility]







Plan of Care





             Planned Activity Planned Date Details      Comments     Source

 

                    Future Scheduled Test 2020-10-01 00:00:00 INFLUENZA VACCINE 

[code = INFLUENZA 

VACCINE]                                            [facility]

 

                    Future Scheduled Test 2020 00:00:00 INFLUENZA VACCINE 

(#1) [code = 

INFLUENZA VACCINE (#1)]                             [facility]

 

                    Future Scheduled Test 2012-11-10 00:00:00 PNEUMOCOCCAL 65+ L

OW/MEDIUM RISK (1 of

2 - PCV13) [code = PNEUMOCOCCAL 65+ LOW/MEDIUM RISK (1 of 2 - PCV13)]           

                [facility]

 

                    Future Scheduled Test 2012-11-10 00:00:00 65+ PNEUMOCOCCAL V

ACCINE (2 of 2 - 

PPSV23) [code = 65+ PNEUMOCOCCAL VACCINE (2 of 2 - PPSV23)]                     

      [facility]

 

                    Future Scheduled Test 1997-11-10 00:00:00 COLONOSCOPY SCREEN

ING [code = 

COLONOSCOPY SCREENING]                              [facility]

 

                    Future Scheduled Test 1997-11-10 00:00:00 SHINGLES VACCINES 

(#1) [code = 

SHINGLES VACCINES (#1)]                             [facility]

 

                    Future Scheduled Test 1947 00:00:00 Screening for jed

gnant neoplasm of 

colon (procedure) [code = 434423176]                           [facility]







Encounters





             Start Date/Time End Date/Time [facility]counter Type [facility]   [facility] Department Encounter ID    Source

 

        2020 14:33:56         Outpatient                 [facility]    [facility]    [id number]

[id number]    [facility]

 

           2020 08:30:00 2020 08:30:00 [facility]                [id number]         

 

           2020 13:00:00 2020 23:59:59 Outpatient            Warren Chandrakami MG

                    MG                [id number]         

 

           2020 05:58:00 2020 23:59:00 Outpatient            Warren Chandrakami SE

                    [facility]                [id number]         

 

        2020-01-10 14:07:48 2020 23:59:59 Outpatient                 [facility] 

   [facility]    [id number]  

 

           2019 09:45:00 2019 23:59:59 Outpatient            Warren Chandrakami MG

                    MG                [id number]         

 

        2018 10:04:00 2018 23:59:59 Outpatient                 [facility]

[facility] 

[id number]                             

 

           2018 11:02:00 2018 23:59:00 Outpatient            Myles Pelletier [facility]

                    [facility]              [id number]         

 

           2018 12:00:00 2018 12:00:00 Outpatient            VIS[facility], 

[facility] 

[facility]           [facility]           [id number]         

 

          2018 14:34:00 2018-03-15 10:25:00 Outpatient           Myles Murphy [facility]                     [id number]               

 

             2018 09:31:00 2018 13:37:00 Departed Emergency Room ER 

          LOVELY DEY           [facility]          [id number]    [facility]

 

           2018 09:17:00 2018 09:17:00 Registered Clinic MANDEEP WANG 

[facility]              [id number]        [facility]

 

           2017 08:29:00 2017 08:29:00 [facility]              [id number]        [facility]

 

           2017 08:14:00 2017 08:14:00 Registered Clinic ALEX         

[facility]              [id number]        [facility]

 

        2017 07:54:00 2017 07:54:00 Registered Clinic               

  [facility]  

[id number]                            [facility]







Results





           Test Description Test Time  Test Comments Results    Result Comments 

Source

 

                CT ABDOMEN/PELVIS WO 2020 17:54:00                        

                              

                                                [place] [address]      Patient Name: SELVIN PATEL                                MR 
#: [id number]                  : 1947                                  
Age/Sex: 72/M  Acct #: [id number]                              Req #: [id number]
[id number]  Adm Physician:                                                      
Ordered by: Romulo Soriano MD                         Report #: [id number]  
     Location: ER                                      Room/Bed:                
  
________________________________________________________________________________

___________________    Procedure: [id number] CT/CT ABDOMEN/PELVIS WO  Exam Date: 
20                            Exam Time: 1295                             
                REPORT STATUS: Signed    EXAMINATION: CT of the abdomen and 
pelvis without contrast.       TECHNIQUE: Spiral CT images of the abdomen and 
pelvis were performed from the   lung bases to the lesser trochanters.  No 
intravenous contrast was given per   renal stone protocol..  Coronal and sagi
ttal reformatted images were obtained.      COMPARISON: Renal ultrasound 
2020      CLINICAL HISTORY:Abdominal pain, UTI symptoms and hematuria       
   DISCUSSION: ABSENCE OF INTRAVENOUS CONTRAST DECREASES SENSITIVITY FOR 
DETECTION   OF FOCAL LESIONS AND VASCULAR PATHOLOGY.      ABDOMEN/PELVIS:      
LOWER THORAX: Lung bases are grossly clear.       HEPATOBILIARY: Diffuse hepatic
steatosis. Hepatic size and contour are normal.   No focal lesions.  No intra or
extrahepatic biliary ductal dilation.      GALLBLADDER: No radio-opaque stones 
or sludge.  No wall thickening.      SPLEEN: No splenomegaly.      PANCREAS: No 
focal masses or ductal dilatation.      ADRENALS: No adrenal nodules.      
KIDNEYS/URETERS: No renal or ureteral calculi, hydronephrosis or obstruction.   
No contour abnormalities.   1.6 cm fluid density simple cyst in the left 
interpolar region (series 3, image   62).   Mild to moderate bilateral 
perinephric stranding.   No contour abnormalities.      PELVIC ORGANS/BLADDER: 
Moderate circumferential bladder wall thickening.   Prostate measures 4.8 x 3.4 
x 3.9 cm (estimated volume 33 mL), and contains   dystrophic calcifications. 
Seminal vesicles are unremarkable.      PERITONEUM/RETROPERITONEUM: No free air 
or fluid.      LYMPH NODES: No intra-abdominal,retroperitoneal, pelvic or 
inguinal   lymphadenopathy.      VESSELS: Mild atherosclerotic calcification of 
the distal abdominal aorta.      GI TRACT: No bowel dilation or evidence of 
obstruction.      BONES AND SOFT TISSUES: No aggressive lytic or suspicious 
focal sclerotic   lesions. Small bilateral fat-containing inguinal hernias, left
greater than   right. Soft tissues are otherwise unremarkable      IMPRESSION:  
       1. Moderate circumferential bladder wall thickening. This may be 
secondary to   bladder outlet obstruction from a mildly enlarged prostate, 
however, cystitis   is also a consideration.      2. Mild to moderate bilateral 
perinephric stranding. No renal, ureteral or   bladder calculi. No 
hydronephrosis or obstruction. Unable to evaluate for   pyelonephritis given the
lack of intravenous contrast.      3. Diffuse hepatic steatosis.      Signed by:
Dr. Rober Hastings M.D. on 2020 6:08 PM        Dictated By: ROBER HASTINGS MD  Electronically Signed By: ROBER HASTINGS MD on 20  
Transcribed By: VALDEMAR on 20       COPY TO:   ROMULO SORIANO MD 
                                                     

 

                CHEST SINGLE (PORTABLE) 2020 16:12:00                     

                              

                                                   [facility]                        [address] [place] [address]      Patient Name: SELVIN PATEL                                MR 
#: [id number]                  : 1947                                  
Age/Sex: 72/M  Acct #: [id number]                              Req #: [id number]
[id number]  Adm Physician:                                                      
Ordered by: Romulo Soriano MD                         Report #: [id number]  
     Location: ER                                      Room/Bed:                
  
________________________________________________________________________________

___________________    Procedure: [id number] DX/CHEST SINGLE (PORTABLE)  Exam 
Date: 20                            Exam Time: 1549                       
                      REPORT STATUS: Signed    EXAMINATION:  CHEST SINGLE 
(PORTABLE)          INDICATION: Fever      COMPARISON: Chest radiograph 3/5/2019
          FINDINGS:      LINES/TUBES:None      LUNGS:The lungs are well-
inflated. No focal consolidation or pulmonary edema.      PLEURA:No pleural effu
faviola or pneumothorax.      MEDIASTINUM:The cardiomediastinal silhouette appears 
normal in size and shape.      BONES/SOFT TISSUES:No acute osseous injury. Right
proximal humerus anchors.      ABDOMEN:No free air under the diaphragm.         
IMPRESSION:    No focal pneumonia or pulmonary edema.      Signed by: Mg Billingsley MD on 2020 4:13 PM        Dictated By: MG BILLINGSLEY MD  Electronically 
Signed By: MG BILLINGSLEY MD on 20 1613  Transcribed By: VALDEMAR on 20 
1613       COPY TO:   ROMULO SORIANO MD                                     

 

                US RENAL RETROPERITONEAL COMP 2020 10:06:00               

                              

                                                         [facility]                        [address] [place] [address]      Patient Name: SELVIN PATEL                          
     MR #: [id number]                  : 1947                          
        Age/Sex: 72/M  Acct #: [id number]                              Req #: 
[id number]  Adm Physician:                                                      
Ordered by: MANDEEP WHITE MD                         Report #: [id number]      
 Location:                                       Room/Bed:                   
________________________________________________________________________________

___________________    Procedure: [id number] US/US RENAL RETROPERITONEAL COMP  
Exam Date: 20                            Exam Time: 823                  
                           REPORT STATUS: Signed    EXAM: Renal Ultrasound   
INDICATION:            2020    NEOPLASM OF UNCERTAIN OF UNSPC'D 
KIDNEY     COMPARISON: CT abdomen and pelvis of 3/11/2019    TECHNIQUE: 
Transverse and longitudinal images of the kidneys and bladder were   obtained.  
     FINDINGS:           Right Kidney:    Length: 12.7 cm   Appearance: Normal 
echogenicity.     Collecting system: No hydronephrosis   Stones: None   
Cyst/Mass: None      Left Kidney:    Length: 12.3 cm   Appearance: Normal 
echogenicity.     Collecting system: No hydronephrosis   Stones: None   
Cyst/Mass: Upper pole 1.4 cm anechoic simple cyst.      Bladder:    No mass or 
calculi. Bilateral ureteral jets visualized. Prevoid volume estimate   of 348 
cc.      Prostate volume estimate of 40.2 cc.      IMPRESSION:   Left upper pole
simple cyst.      No hydronephrosis or renal calculi.      Signed by: Mg Billingsley MD on 2020 10:08 AM        Dictated By: MG BILLINGSLEY MD  Electronically 
Signed By: MG BILLINGSLEY MD on 20 1008  Transcribed By: VALDEMAR on 20 
1008       COPY TO:   MANDEEP WHITE MD                                     

 

           CHEM PANEL 2020 14:19:00            0.9                   [facility]

 

           CHEM PANEL 2020 14:19:00            85                    [facility]

 

                ABDOMEN-1VIEW (KUB) 2019-10-10 10:08:00                         

                              

                                               [place] [address]
     Patient Name: SELVIN PATEL                                   MR #: 
[id number]                     : 1947                                  
Age/Sex: 71/M  Acct #: [id number]                              Req #: [id number]
[id number]  Adm Physician:                                                      
Ordered by: MANDEEP WHITE MD                            Report #: [id number]   
    Location: [facility]                                     Room/Bed:                 
   
________________________________________________________________________________

___________________    Procedure: [id number] DX/ABDOMEN-1VIEW (KUB)  Exam Date: 
10/10/19                            Exam Time: 0910                             
                REPORT STATUS: Signed       Abdomen, 1 view.         History: 
Kidney stones.      Comparison: 2019.      Findings: Air is scattered 
throughout nondilated small and large bowel. There   are no masses. The 
previously seen small calcification projected over the right   renal lower pole 
is not seen on today's exam. The osseous structures are   intact.         
IMPRESSION:   Non-specific bowel gas pattern.      Signed by: Andrey Barone on 
10/10/2019 10:10 AM        Dictated By: ANDREY BARONE MD  Electronically Signed 
By: ANDREY BARONE MD on 10/10/19 1010  Transcribed By: VALDEMAR on 10/10/19 1010 
     COPY TO:   MANDEEP WHITE MD                                     

 

                    POC glucose         2019 12:15:21   

 

                                        Test Item

 

             POC glucose (test code = 40049-9) 110 mg/dL    65-99        H      

      Meter ID: 

[id number]: Radha Alaina 

 

             Lab Interpretation (test code = 64437-5) Abnormal                  

              





[facility] [id number] 11:50:22Hm Interface, Radiology 
Results 2019 11:53 AM CDTEXAMINATION:  XR ABDOMEN 1 VWCLINICAL 
HISTORY:  KUB for renal stone location preopCOMPARISON:  2019IMPRESSION:A 
couple of faint 3 mm calculi overlie the right lower renal pole.The bowel gas 
pattern is unremarkable.There is no significant skeletal abn
ormality.[id number] MethodistLACTIC ACID [id number] 12:50:00* 



             Test Item    Value        Reference Range Interpretation Comments

 

             LACTIC ACID POC (test code = LACTP) 2.20 mmol/L  0.7-2.0      HH   

         





[id number] 11:01:00* 



             Test Item    Value        Reference Range Interpretation Comments

 

             POTASSIUM (test code = K) 5.0 mmol/L   3.4-5.0      N             





HGB   [id number] 10:53:00* 



             Test Item    Value        Reference Range Interpretation Comments

 

             HEMOGLOBIN (test code = HGB) 11.8 g/dL    14.0-18.0    L           

  

 

             HEMATOCRIT (test code = HCT) 37.1 %       37.0-49.0    N           

  





- CT ABD PELVIS W/[id number] 10:12:00 FAX:        Beulah Walton MD       
[phone]    [place]:   St: REG----------
---------------------------------------------------------------------  Name:  SELVIN SIBLEY                     [facility]                     : 11/10/194
7   Age/S: 71/M           [address]                  Unit: [id number]      
Loc: [place] [address]                Phys:  Beulah Walton MD     
                                              Acct:  [id number] Dis Date:     
         Status: REG ER                                  PHONE #: [phone]  
  Exam Date: 2019 1000                        FAX #: [phone]     
Reason: llq pain                                            EXAMS:              
                                CPT CODE:      [id number] CT ABD PELVIS W/CONT   
                   10346                    EXAM:  - CT ABD PELVIS W/CONT       
       LOCATION: C3               INDICATION: 71 years -old Male with llq pain  
            TECHNIQUE: Contrast -        IV contrast was given. No oral contrast
was given        Portal venous phase - abdomen and pelvis       Delayed images 
through the abdomen.       Reconstructions - coronal and sagittal planes        
      This exam was performed according to our departmental       dose-opti
mization program, which includes automated exposure control,       adjustment of
the mA and/or kV according to patient size and/or use of       iterative recons
truction technique               COMPARISON: None               FINDINGS:       
Statements: None.               Thoracic: Included images of the lower chest dem
onstrate no       abnormalities.               Hepatobiliary: Liver demonstrates
diffuse decrease in attenuation       without focal lesion. The gallbladder is 
normal. No biliary dilation.               Pancreas: Normal.               Splee
n: Normal.                Adrenals: Normal.               Genitourinary: 1.2 cm 
left renal cyst noted.  No hydronephrosis.        Evaluation of the bladder is l
imited, but no obvious bladder       abnormality is present.               Gastr
ointestinal: Mild inflammation identified at the proximal aspect       of the si
gmoid in this patient with diverticula. The appendix is not       visualized.   
           Vascular: Atherosclerotic calcifications are seen within the aorta a
nd       branch vessels.      PAGE  1                       Signed Report       
            (CONTINUED)  FAX:        Beulah Walton MD       [phone]    [place]:   St: REG-----------------------------------------------------------------
--------------  Name:  SELVIN PATEL                     [facility]          
          : 1947   Age/S: 71/M           [address]                
 Unit: [id number]       Loc: [place] [address]                P
hys:  Beulah Walton MD                                                    Acct:  
[id number] Dis Date:               Status: REG ER                             
    PHONE #: [phone]     Exam Date: 2019 1000                        
FAX #: [phone]     Reason: llq pain                                        
   EXAMS:                                               CPT CODE:      [id number]
CT ABD PELVIS W/CONT                       63015               <Continued>      
         Lymphatics: No enlarged lymph nodes by CT size criteria.               
Bones/Soft Tissues: No acute osseous findings. No ventral hernias.              
Peritoneum/Other: No extraluminal air. No extraluminal fluid.                 
IMPRESSION:                   Uncomplicated mild sigmoid diverticulitis.        
          Hepatic steatosis.          ** Electronically Signed by Yuval Patterson MD on 2019 at 1012 **                      Reported and signed by: Yuval Patterson MD                         CC: Beulah Walton MD                           
                                                                                
             Technologist: Jazzmine FOX,MANSOOR                          
 Trnscrd Dt/Tm: 2019 (1012) t.SDR.HV2                          Orig Print 
D/T: S: 2019 (1015                              PAGE  2                   
   Signed Report                               BASIC METABOLIC [id number] 
09:57:00* 



             Test Item    Value        Reference Range Interpretation Comments

 

             SODIUM (test code = NA) 134 mmol/L   137-145      L             

 

             POTASSIUM (test code = K) 5.6 mmol/L   3.4-5.0      H            IS

 THE SAMPLE 

HEMOLYZED?:NHEMOLYSIS GRADE:

 

             CHLORIDE (test code = CL) 98 mmol/L           N             

 

             CARBON DIOXIDE (test code = CO2) 22 mmol/L    22-30        N       

      

 

             GLUCOSE (test code = GLU) 331 mg/dL           H             

 

             BLOOD UREA NITROGEN (test code = BUN) 26 mg/dL     9-20         H  

           

 

             GLOMERULAR FILTRATION RATE (test code = GFR) 63           >60      

                 The estimated glomerular 

filtration rate is computed usingpatient race, age (>18), sex, and serum 
creatinine. If anyof the needed data elements are missing the Laboratory cannot 
compute an estimation of the glomerular filtration rate.

 

             CREATININE (test code = CREAT) 1.2 mg/dL    0.7-1.3      N         

    

 

             CALCIUM (test code = CA) 8.8 mg/dL    8.4-10.2     N             





LIVER FUNCTION [id number] 09:57:00* 



             Test Item    Value        Reference Range Interpretation Comments

 

             TOTAL PROTEIN (test code = PROT) 6.8 g/dL     6.3-8.2      N       

      

 

             ALBUMIN (test code = ALB) 4.1 g/dL     3.5-5.0      N             

 

             BILIRUBIN TOTAL (test code = BILT) 0.3 mg/dL    0.2-1.3      N     

        

 

             BILIRUBIN CONJUGATED (test code = BILCON) 0 mg/dL      0-0.3       

 N            

~~~~~~~~~~~~~~~~~~~~~~~~~~~~~~~~~~~~~~~~~~~~~~~~~~~~~~~~~~~~CONJUGATED BILIRUBIN
IS THE REPLACEMENT ASSAY FOR 
DIRECTBILIRUBIN.~~~~~~~~~~~~~~~~~~~~~~~~~~~~~~~~~~~~~~~~~~~~~~~~~~~~~~~~~~~~

 

             BILIRUBIN UNCONJUGATED (test code = BILUNC) 0.2 mg/dL    0-1.1     

   N             

 

             SGOT/AST (test code = AST) 17 U/L       15-46        N             

 

             SGPT/ALT (test code = ALT) 19 U/L       13-69        N             

 

             ALKALINE PHOSPHATASE (test code = ALKP) 38 U/L              N

             





[id number] 09:57:00* 



             Test Item    Value        Reference Range Interpretation Comments

 

             LIPASE (test code = LIP) 255 U/L             N             





BEDSIDE [id number] 09:53:00* 



             Test Item    Value        Reference Range Interpretation Comments

 

             BEDSIDE CREATININE (test code = CREATBED) 1.2 mg/dL    0.66-1.25   

 N             





PROTHROMBIN [id number] 09:52:00* 



             Test Item    Value        Reference Range Interpretation Comments

 

             PROTHROMBIN TIME PATIENT (test code = PTP) 12.2 SECONDS 9.2-12.1   

  H             

 

             INTERNATIONAL NORMAL RATIO (test code = INR) 1.1                   

                 The INR is to be used only 

for monitoring ORAL ANTICOAGULANTTHERAPY.         Indication                    
            INR Value1.  Prophylaxis/treatment of:                            
Venous Thrombosis, Pulmonary Embolism        2.0 - 3.02.  Prevention of systemic
embolism from:      Tissue heart valves                          2.0 - 3.0      
Acute myocardial infarction (to present      systemic embolism)*                
         2.0 - 3.0      Valvular heart disease                       2.0 - 3.0  
   Atrial fibrillation                          2.0 - 3.03.  Mechanical 
prosthetic valves (high risk)       2.5 - 3.5 * If oral anticoagulant therapy is
elected to preventrecurrent myocardial infarction, an INR of 2.5-3.5 
isrecommended, consistent with Food and Drug Administrationrecommendations.





THROMBOPLASTIN TIME [id number] 09:52:00* 



             Test Item    Value        Reference Range Interpretation Comments

 

             THROMBOPLASTIN TIME PARTIAL (test code = PTT) 22.4 SECONDS 23.4-37.

0    L              

Therapeutic Range for Heparin EFFECTIVE 7/10/13 Heparin IU/mL                   
aPTT Seconds0.3                              64.30.7                            
 88.8





CBC W/AUTO [id number] 09:44:00* 



             Test Item    Value        Reference Range Interpretation Comments

 

             WHITE BLOOD CELL (test code = WBC) 9.5 x10 3/uL 5.0-12.0     N     

        

 

             RED BLOOD CELL (test code = RBC) 5.09 x10 6/uL 4.70-6.10    N      

       

 

             HEMOGLOBIN (test code = HGB) 12.4 g/dL    14.0-18.0    L           

  

 

             HEMATOCRIT (test code = HCT) 40.0 %       37.0-49.0    N           

  

 

             MEAN CELL VOLUME (test code = MCV) 79 fL        80-94        L     

        

 

             MEAN CELL HGB (test code = MCH) 24.4 pg      27-31        L        

     

 

             MEAN CELL HGB CONCENTRATION (test code = MCHC) 31.0 g/dL    33-37  

      L             

 

             RED CELL DISTRIBUTION WIDTH (test code = RDW) 14.8 %       11.5-15.

5    N             

 

             PLATELET COUNT (test code = PLT) 288 x10 3/uL 130-400      N       

      

 

             MEAN PLATELET VOLUME (test code = MPV) 10.4 fL      9.4-16.4     N 

            

 

             NEUTROPHIL % (test code = NT%) 73.4 %       43-65        H         

    

 

             IMMATURE GRANULOCYTE % (test code = IG%) 0.6 %        0.0-2.0      

N             

 

             LYMPHOCYTE % (test code = LY%) 19.3 %       20.5-45.5    L         

    

 

             MONOCYTE % (test code = MO%) 4.7 %        5.5-11.7     L           

  

 

             EOSINOPHIL % (test code = EO%) 1.8 %        0.9-2.9      N         

    

 

             BASOPHIL % (test code = BA%) 0.2 %        0.2-1.0      N           

  

 

             NUCLEATED RBC % (test code = NRBC%) 0.0 %        0-1.0        N    

         

 

             NEUTROPHIL # (test code = NT#) 6.96 x10 3/uL 2.2-4.8      H        

     

 

             IMMATURE GRANULOCYTE # (test code = IG#) 0.06 x10 3/uL 0-0.03      

 H             

 

             LYMPHOCYTE # (test code = LY#) 1.83 x10 3/uL 1.3-2.9      N        

     

 

             MONOCYTE # (test code = MO#) 0.45 x10 3/uL 0.3-0.8      N          

   

 

             EOSINOPHIL # (test code = EO#) 0.17 x10 3/uL 0.0-0.2      N        

     

 

             BASOPHIL # (test code = BA#) 0.02 x10 3/uL 0.0-0.1      N          

   





ABDOMEN-1VIEW (KUB)2019 09:21:00                                          
                                           [facility]    
                   [address] [address]    
 Patient Name: SELVIN PATEL                                   MR #: 
[id number]                     : 1947                                  
Age/Sex: 71/M  Acct #: [id number]                              Req #: [id number]
[id number]  Adm Physician:                                                      
Ordered by: MANDEEP WHITE MD                            Report #: [id number]   
    Location: [facility]                                     Room/Bed:                 
   ______________________________________________
_____________________________________________________    Procedure: [id number] DX
/ABDOMEN-1VIEW (KUB)  Exam Date: 19                            Exam Time: 
0850                                              REPORT STATUS: Signed    Exam:
KUB - 2 views      Clinical History: Renal calculus.      Comparison: CT abdome
n/pelvis 3/11/2019.      Findings:    There is a 5 mm calcification overlying th
e right lower pole kidney. No   evidence of calcification overlying the left kid
karla or expected course of the   ureters. There are prostatic calcifications.    
 Nonobstructive bowel gas pattern. Moderate amount of stool in the colon.      
No acute bony abnormality.      Impression:   A 5 mm calcification overlying the
right lower pole kidney, corresponding to   stone seen on prior CT from 3/11/20
19.      Signed by: Dr. Ruma Méndez MD on 2019 9:25 AM        Dictated By: 
RUMA MÉNDEZ MD  Electronically Signed By: RUMA MÉNDEZ MD on 19  Transcr
ibed By: VALDEMAR on 19       COPY TO:   MANDEEP WHITE MD         CT 
ABDOMEN/PELVIS [id number] 10:12:00                                           
                                          [address] [address]     
Patient Name: SELVIN PATEL                                   MR #: [id number]
                    : 1947                                   Age/Sex: 
71/M  Acct #: [id number]                              Req #: [id number]  [facility] 
Physician:                                                      Ordered by: 
MANDEEP WHITE MD                            Report #: [id number]        
Location: CT                                      Room/Bed:                     
______________________________________________
_____________________________________________________    Procedure: [id number] CT
/CT ABDOMEN/PELVIS WOW  Exam Date: 19                            Exam Time
: 0850                                              REPORT STATUS: Signed    EXA
M: CT Abdomen and Pelvis WITHOUT and WITH contrast     INDICATION: Renal neoplas
m   COMPARISON: CT abdomen/pelvis, 2019   TECHNIQUE:  Abdomen and pelvis we
re scanned utilizing a multidetector helical   scanner from the lung base to the
pubic symphysis before and after   administration of IV contrast. Coronal and s
agittal reformations were obtained.   Imaging was obtained precontrast, arterial
phase, venous phase and delayed   phase. Renal mass protocol was performed, with
abdomen and pelvis scanning on   delayed imaging.      Dose modulation, iterat
jonathan reconstruction, and/or weight based adjustment of   the mA/kV was utilized t
o reduce the radiation dose to as low as reasonably   achievable.               
   IV CONTRAST: 100 mL of Isovue-370               ORAL CONTRAST: 900 cc water  
            RADIATION DOSE: Total DLP: 1803.90 mGy*cm                Estimated 
effective dose: (DLP x 0.015 x size factor) mSv               COMPLICATIONS: No
ne      FINDINGS:      LINES and TUBES: None.      LOWER THORAX:  Lung bases collette
ar. Heart size normal.      HEPATOBILIARY: Diffuse low density hepatic parenchym
a compatible with   steatosis.  No focal hepatic lesions. No biliary ductal dila
tion.       GALLBLADDER: No radio-opaque stones or sludge.  No wall thickening. 
    SPLEEN: No splenomegaly.       PANCREAS: No focal masses or ductal dilatati
on.        ADRENALS: No adrenal nodules          KIDNEYS/URETERS: Kidneys enhanc
e symmetrically.  No hydronephrosis or ureteral   dilatation no filling defects 
in opacified collecting systems. There is a well   marginated 1.1 cm peripheral 
low density mass in the lateral upper aspect of   the left kidney. This is best 
seen on postcontrast imaging. The internal   density measures under 10 HU on eac
h phase of the examination with no evidence   for enhancement.  Again noted is a
5 mm calcification in the lower pole right   collecting system compatible with 
nonobstructing intrarenal calculus. There is   very mild perinephric stranding b
ilaterally.      GI TRACT: No abnormal distention, wall thickening, or evidence 
of bowel   obstruction.  There are scattered colonic diverticula with no CT evid
ence for   acute diverticulitis.  The appendix is not conspicuously visualized b
ut there   is no right lower quadrant inflammation to suggest appendicitis.     
PELVIC ORGANS/BLADDER: Partially opacified urinary bladder unremarkable. No   d
iscrete abnormal mass or fluid collection in the pelvis.      LYMPH NODES: No do
minant lymph node mass is seen in the abdomen,   retroperitoneum or pelvis.     
VESSELS: There are scattered calcified plaques along the abdominal aorta. No   
abdominal aortic aneurysm or dissection. Renal arteries are patent with an   acc
essory left renal artery noted.  IVC unremarkable. Portal system is patent,   on
ly partially included on the venous phase which is targeted at the kidneys.     
PERITONEUM / RETROPERITONEUM: No pneumoperitoneum or ascites.      BONES: No ac
[place] or suspicious bony lesions.      SOFT TISSUES: Superficial surrounding soft 
tissue unremarkable.   Small left   inguinal hernia containing fat.            I
MPRESSION:    1.  1.1 cm hypodensity in the lateral upper pole of the left kidne
y is cyst   density, well marginated and shows no evidence for enhancement. This
is   compatible with a small cortical cyst.      2.  Again noted is a 5 mm nono
bstructing intrarenal calculus the lower pole of   the right collecting system. 
    3.  No other acute finding or significant change from previous CT.      Sig
mary by: Dr. Robert Nolasco M.D. on 3/11/2019 10:37 AM        Dictated By: ERICK NOLASCO MD  Electronically Signed By: ROBERT NOLASCO MD on 19 1037  Transcr
ibed By: VALDEMAR on 19 1037       COPY TO:   MANDEEP WHITE MD          
[id number] 17:21:00                                                      
                               [address] [place] [address]      Patient 
Name: SELVIN PATEL                                   MR #: [id number]        
            : 1947                                   Age/Sex: 71/M  
Acct #: [id number]                              Req #: [id number]  Adm 
Physician:                                                      Ordered by: 
KARLEE BANUELOS M.D.                            Report #: [id number]        
Location: [place]                                      Room/Bed:                     
___________________________________________
________________________________________________________    Procedure: 
US/US THYROID  Exam Date: 19                            Exam Time: 1348   
                                          REPORT STATUS: Signed    EXAM: Thyroid
Ultrasound   INDICATION:         THYROID CANCER. Thyroidectomy .    JATINDER
RISON: None    TECHNIQUE: Transverse and sagittal images were obtained of the th
yroid bed.       FINDINGS:      Thyroid bed:   Status post thyroidectomy. No res
idual thyroid tissue or recurrent thyroid   tissue is noted in the thyroid bed b
ilaterally.        Lymph nodes:   No suspicious appearing lymph nodes.        IM
PRESSION:      Status post thyroidectomy. No evidence to suggest residual or rec
urrent   disease.      Signed by: Dr. Joselito Dunlap M.D. on 3/5/2019 5:22 PM        D
ictated By: JOSELITO DUNLAP MD  Electronically Signed By: JOSELITO DUNLAP MD on 19  T
ranscribed By: VALDEMAR on 19       COPY TO:   KARLEE BANUELOS M.D.   
     [facility] 2 [id number] 15:13:00                                           
                                          [address] [place] [address]     
Patient Name: SELVIN PATEL                                   MR #: [id number]
                    : 1947                                   Age/Sex: 
71/M  Acct #: [id number]                              Req #: [id number]  Adm 
Physician:                                                      Ordered by: 
KARLEE BANUELOS M.D.                            Report #: [id number]        
Location: [place]                                      Room/Bed:                     
___________________________________________
________________________________________________________    Procedure: [id number]
DX/CHEST 2 VIEWS  Exam Date: 19                            Exam Time: 1340
                                             REPORT STATUS: Signed       EXAMI
NATION: PA and lateral views of the chest.      COMPARISON: None      CLINICAL H
ISTORY:  Thyroid cancer           DISCUSSION:      Lines/tubes:  None.      Lung
s:  The lungs are well inflated and clear. No pneumonia or pulmonary edema.     
Pleura:  No pleural effusion or pneumothorax.      Heart and mediastinum:  The 
cardiomediastinal silhouette is normal.      Bones and soft tissues:  No acute b
damari abnormalities.           IMPRESSION:       No acute cardiopulmonary abnormal
ities.                  Signed by: Dr. Andrew Palisch, M.D. on 3/5/2019 3:14 PM 
      Dictated By: ANDREW R PALISCH MD  Electronically Signed By: CHI ÁLVAREZ MD on 19 1514  Transcribed By: VALDEMAR on 194       COPY TO:
  KARLEE BANUELOS M.D.         CT ABDOMEN/PELVIS [id number] 10:30:00          
                                                                           [address] [place] [address]      Patient Name: SELVIN PATEL          
                        MR #: [id number]                     : 1947    
                              Age/Sex: 71/M  Acct #: [id number]               
              Req #: [id number]  Adm Physician:                                 
                    Ordered by: MANDEEP WHITE MD                            
Report #: [id number]        Location: CT                                      
Room/Bed:                     ______________________________________________
_____________________________________________________    Procedure: [id number] CT
/CT ABDOMEN/PELVIS WO  Exam Date: 19                            Exam Time:
910                                              REPORT STATUS: Signed    EXAM:
CT ABDOMEN AND PELVIS without IV CONTRAST   DATE: 2019 Time stamp on Exam: 
9:37 AM   INDICATION:  Hematuria   COMPARISON:  None    TECHNIQUE: The abdomen 
and pelvis were scanned using a multidetector helical   scanner. Coronal and sa
gittal reformations were obtained. Routine protocol   performed.      Low-dose t
echnique was utilized.      IV Contrast: None   Oral Contrast: None   Radiation 
Dose: Total .08 mGy*cm   Estimated effective dose: DLP x 0.015 x size fac
tor      FINDINGS:   LOWER THORAX: No consolidations      LIVER: No masses with 
diffuse fatty infiltration of the liver.   BILIARY: The gallbladder is unremarka
ble.  No ductal dilatation.      SPLEEN: No masses   PANCREAS: No masses      AD
RENALS: No nodules   KIDNEYS: There are 2 small right lower pole renal stones wi
th a composite   measurement of 6 mm. No evidence of hydronephrosis. Contour abn
ormality of the   left upper interpolar region with subtle low density may repre
sent a cyst or a   mass. In a patient with gross hematuria CT renal mass protoco
l follow-up would   be of benefit. Nonspecific bilateral perinephric fat strandi
ng.      GI TRACT: No distention, wall thickening or evidence of obstruction.   
      VESSELS: Vascular calcification.   PERITONEUM/RETROPERITONEUM: No free air
or fluid   LYMPH NODES: No lymphadenopathy      REPRODUCTIVE ORGANS: Prostate 
calcification.   BLADDER: Unremarkable      SOFT TISSUES: Unremarkable   BONES: 
No suspicious bone lesions. Mild degenerative changes of the spine.      IMPRESS
ION:   1. Two small nonobstructing right lower pole renal stones.   2. Subtle le
ft lateral renal interpolar contour abnormality with focal   hypodensity.   3. F
ollow-up renal mass protocol is recommended.   4. Diffuse fatty infiltration of 
the liver.      Signed by: Dr. Jose Maria Meza DO on 2019 10:43 AM        Di
ctated By: JOSE MARIA MEZA DO  Electronically Signed By: JOSE MARIA MEZA DO on  1043  Transcribed By: VALDEMAR on 19 1043       COPY TO:   RAVINDRA WHITE RD, MD         ABDOMEN-1VIEW (KUB)2018 09:41:00                            
                                                         [place] [address]      Patient Name: SELVIN PATEL                        
          MR #: [id number]                     : 1947                  
                Age/Sex: 71/M  Acct #: [id number]                             
Req #: [id number]  Adm Physician:                                               
      Ordered by: MANDEEP WHITE MD                            Report #: [id number]
[id number]        Location: [facility]                                     Room/Bed:         
           ______________________________________________
_____________________________________________________    Procedure: [id number] DX
/ABDOMEN-1VIEW (KUB)  Exam Date: 18                            Exam Time: 
0852                                              REPORT STATUS: Signed    PROCE
DURE:   X-RAY ABDOMEN - KUB       COMPARISON:   KUB 18.       INDICATIONS: 
 CALCULUS OF KIDNEY       FINDINGS:      Again noted are right lower pole renal 
stones measuring up to 5 mm. No    evidence of calcifications overlying the left
kidney or the expected    course of bilateral ureters.        There is a non-o
bstructed bowel-gas pattern. There are no acute osseous    abnormalities. The ramos
ng bases are clear.       CONCLUSION:      Similar appearance of right lower gwendolyn
e renal stones measuring up to 5    mm.       Dictated by:  RUMA MÉNDEZ M.D. on 
2018 at 9:41        Electronically approved by:  RUMA MÉNDEZ M.D. on  at 9:41                Dictated By: RUMA MÉNDEZ MD  Electronically Signed B
y: RUMA MÉNDEZ MD on 18  Transcribed By: TIFFANI on 18       
COPY TO:   MANDEEP WHIET MD        ABDOMEN-1VIEW (KUB)2018 12:18:00    [place] [address]      Patient Name: SELVIN PATEL   MR #: [id number]    : 
1947 Age/Sex: 70/M  Acct #: [id number] Req #: [id number]  Adm Physician:
    Ordered by: MANDEEP WHITE MD  Report #: [id number]   Location: [facility]
ed:     ________________________________________________________________________
___________________________    Procedure:  DX/ABDOMEN-1VIEW (KUB)  Exam
Date: 18                            Exam Time: 0950       REPORT STATUS: 
Signed    PROCEDURE:   X-RAY ABDOMEN - KUB       COMPARISON:   [facility], DX, ABDOMEN-1VIEW (KUB),    3/12/2018, 9:39.       INDICATIONS:   CALCUL
US OF KIDNEY       FINDINGS:      Lung bases: Clear.   Bowel: Normal bowel gas p
attern. No dilated bowel loops.   Calcifications: 2-3 tiny calcifications overly
ing the lower pole of the    right kidney are stable. No calcifications over the
left renal shadow    or along the expected course of the ureters.    Bones/soft 
tissues: Unremarkable.       CONCLUSION:          Right intrarenal calculi as d
escribed above. No new calculus has    developed.       Dictated by:  Isa estrada M.D. on 2018 at 12:18        Electronically approved by:  Isa estrada M.D. on 2018 at    12:18                Dictated By: ISA PERKINS MD  Electronically Signed By: ISA ARMSTRONG MD on 18 1218  Transcri
bed By: TIFFANI on 18 1218       COPY TO:   MANDEEP WHITE MD        BLOOD 
BANK MEIZCKR6045-23-15 20:12:00Negative (3/14/18 3:12 PM)[facility] 
ONNLT3990-53-51 20:03:0081[facility] UUDPR4829-83-14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

## 2020-08-27 NOTE — EMERGENCY DEPARTMENT NOTE
History of Present Illnes


History of Present Illness


Chief Complaint:  Sepsis


History of Present Illness


This is a 72 year old  male PRESENTS TO THE ER C/O URINARY FREQUENCY, 

BURNING WITH


   URINATION AND DYSURIA ONSET "3-4 WEEKS" PTA; PT ALSO REPORTS WEAKNESS; PT 

   DENIES


   FEVER/CHILLS, N/V/D; PT FEBRILE IN TRIAGE, TEMP 102.9. pt seen in this er for

   same on 8/18/20.  .


Historian:  Patient


Arrival Mode:  Car


 Required:  No


Onset (how long ago):  week(s) (4)


Location:  penile


Quality:  dysuria, frequency


Radiation:  Reports non-radiation


Severity:  moderate


Onset quality:  gradual


Duration (how long):  week(s) (4)


Timing of current episode:  constant


Progression:  worsening


Context:  Denies recent illness, Denies recent surgery


Relieving factors:  none


Exacerbating factors:  none


Associated symptoms:  Reports weakness


Treatments prior to arrival:  none





Past Medical/Family History


Physician Review


I have reviewed the patient's past medical and family history.  Any updates have

been documented here.





Past Medical History


Recent Fever:  Yes


Clinical Suspicion of Infectio:  Yes


New/Unexplained Change in Ment:  No


Past Medical History:  Hypertension, Diabetes, Hypothyroidism, UTI's, Hype

rlipedemia


Other Medical History:  


NEUROPATHY


Other Surgery:  


rotater cuff, hernia repair, thyroid removed





Social History


Smoking Cessation:  Never Smoker


Alcohol Use:  Occasional


Any Illegal Drug Use:  No





Family History


Other family history


htn,dm





Other


Last Tetanus:  unk





Review of Systems


Review of Systems


Constitutional:  Reports weakness


EENTM:  Reports no symptoms


Cardiovascular:  Reports no symptoms


Respiratory:  Reports no symptoms


Gastrointestinal:  Reports no symptoms


Genitourinary:  Reports dysuria, Reports frequency, Reports pain


Musculoskeletal:  Reports no symptoms


Integumentary:  Reports no symptoms


Neurological:  Reports no symptoms


Psychological:  Reports no symptoms


Endocrine:  Reports no symptoms


Hematological/Lymphatic:  Reports no symptoms





Physical Exam


Related Data


Allergies:  


Coded Allergies:  


     Penicillins (Verified  Allergy, Mild, 10/4/12)


     levofloxacin (Verified  Allergy, Mild, ITCHING, 2/28/12)


Uncoded Allergies:  


     IVP DYE (Allergy, Mild, 10/4/12)


Triage Vital Signs





Vital Signs








  Date Time  Temp Pulse Resp B/P (MAP) Pulse Ox O2 Delivery O2 Flow Rate FiO2


 


8/27/20 18:48 102.9 93 20 125/70 100 Room Air  








Vital signs reviewed:  Yes





Physical Exam


CONSTITUTIONAL





Constitutional:  Present well-developed, Present well-nourished; 


   Absent distressed, Absent ill appearing


HENT


HENT:  Present normocephalic, Present atraumatic, Present oropharynx 

clear/moist, Present nose normal


HENT L/R:  Present left ext ear normal, Present right ext ear normal


EYES





Eyes:  Reports PERRL, Reports conjunctivae normal


NECK


Neck:  Present ROM normal


PULMONARY


Pulmonary:  Present effort normal, Present other (BREATH SOUNDS DECREASED AT 

BASES BILATERAL)


CARDIOVASCULAR





Cardiovascular:  Present regular rhythm, Present heart sounds normal, Present 

capillary refill normal, Present normal rate


GASTROINTESTINAL





Abdominal:  Present soft, Present nontender, Present bowel sounds normal


GENITOURINARY





Genitourinary:  Present penis normal


SKIN


Skin:  Present warm, Present dry


MUSCULOSKELETAL





Musculoskeletal:  Present ROM normal


NEUROLOGICAL





Neurological:  Present alert, Present oriented x 3, Present no gross motor or 

sensory deficits


PSYCHOLOGICAL


Psychological:  Present mood/affect normal, Present judgement normal





Results


Laboratory


Laboratory





Laboratory Tests








Test


 8/27/20


20:45 8/27/20


20:30 8/27/20


18:50


 


Coronavirus (PCR)


 Not detected


(NOTDETECTED) 


 





 


Urine Color


 


 Yellow


(YELLOW) 





 


Urine Clarity


 


 Sl cloudy


(CLEAR) 





 


Urine pH  5 (5 - 7)  


 


Urine Specific Gravity


 


 1.010


(1.010-1.025) 





 


Urine Protein


 


 Trace


(NEGATIVE) 





 


Urine Glucose (UA)


 


 Negative


(NEGATIVE) 





 


Urine Ketones


 


 Negative


(NEGATIVE) 





 


Urine Blood


 


 Trace


(NEGATIVE) 





 


Urine Nitrite


 


 Negative


(NEGATIVE) 





 


Urine Bilirubin


 


 Negative


(NEGATIVE) 





 


Urine Urobilinogen


 


 0.2 mg/dL (0.2


- 1) 





 


Urine Leukocyte Esterase


 


 Moderate


(NEGATIVE) 





 


Urine RBC  0-5 /HPF (0-5)  


 


Urine WBC


 


 21-50 /HPF


(0-5) 





 


Urine Epithelial Cells


 


 None /LPF


(NONE) 





 


Urine Bacteria


 


 Many /HPF


(NONE) 





 


White Blood Count


 


 


 19.14 x10e3/uL


(4.8-10.8)


 


Red Blood Count


 


 


 5.36 x10e6/uL


(4.3-5.7)


 


Hemoglobin


 


 


 13.0 g/dL


(14.0-18.0)


 


Hematocrit


 


 


 39.9 %


(38.2-49.6)


 


Mean Corpuscular Volume


 


 


 74.4 fL


(81-99)


 


Mean Corpuscular Hemoglobin


 


 


 24.3 pg


(28-32)


 


Mean Corpuscular Hemoglobin


Concent 


 


 32.6 g/dL


(31-35)


 


Red Cell Distribution Width


 


 


 14.9 %


(11.7-14.4)


 


Platelet Count


 


 


 518 x10e3/uL


(140-360)


 


Neutrophils (%) (Auto)


 


 


 84.7 %


(38.7-80.0)


 


Lymphocytes (%) (Auto)


 


 


 6.3 %


(18.0-39.1)


 


Monocytes (%) (Auto)


 


 


 6.9  %


(4.4-11.3)


 


Eosinophils (%) (Auto)


 


 


 0.2 %


(0.0-6.0)


 


Basophils (%) (Auto)


 


 


 0.3 %


(0.0-1.0)


 


Neutrophils # (Auto)   16.2 (2.1-6.9) 


 


Lymphocytes # (Auto)   1.2 (1.0-3.2) 


 


Monocytes # (Auto)   1.3 (0.2-0.8) 


 


Eosinophils # (Auto)   0.0 (0.0-0.4) 


 


Basophils # (Auto)   0.1 (0.0-0.1) 


 


Absolute Immature Granulocyte


(auto 


 


 0.31 x10e3/uL


(0-0.1)


 


Prothrombin Time


 


 


 13.3 seconds


(11.9-14.5)


 


Prothromb Time International


Ratio 


 


 0.96 





 


Activated Partial


Thromboplast Time 


 


 34.2 seconds


(23.8-35.5)


 


Sodium Level


 


 


 123 mmol/L


(136-145)


 


Potassium Level


 


 


 5.0 mmol/L


(3.5-5.1)


 


Chloride Level


 


 


 92 mmol/L


()


 


Carbon Dioxide Level


 


 


 17 mmol/L


(22-29)


 


Anion Gap


 


 


 19.0 mmol/L


(8-16)


 


Blood Urea Nitrogen


 


 


 22 mg/dL


(7-26)


 


Creatinine


 


 


 1.45 mg/dL


(0.72-1.25)


 


Estimat Glomerular Filtration


Rate 


 


 48 ML/MIN


(60-)


 


BUN/Creatinine Ratio   15 (6-25) 


 


Glucose Level


 


 


 172 mg/dL


()


 


Lactic Acid Level


 


 


 1.2 mmol/L


(0.5-2.0)


 


Calcium Level


 


 


 10.1 mg/dL


(8.4-10.2)


 


Total Bilirubin


 


 


 0.7 mg/dL


(0.2-1.2)


 


Aspartate Amino Transf


(AST/SGOT) 


 


 54 IU/L (5-34) 





 


Alanine Aminotransferase


(ALT/SGPT) 


 


 71 IU/L (0-55) 





 


Alkaline Phosphatase


 


 


 94 IU/L


()


 


Total Protein


 


 


 8.2 g/dL


(6.5-8.1)


 


Albumin


 


 


 2.8 g/dL


(3.5-5.0)


 


Globulin


 


 


 5.4 g/dL


(2.3-3.5)


 


Albumin/Globulin Ratio   0.5 (0.8-2.0) 








Lab results reviewed:  Yes





Imaging


Imaging results reviewed:  Yes


Impressions


Procedure: [id number] DX/CHEST SINGLE (PORTABLE)


Exam Date: 08/27/20                            Exam Time: 1945








                              REPORT STATUS: Signed





EXAMINATION:  CHEST SINGLE (PORTABLE)    





INDICATION: Fever





COMPARISON:  Chest x-ray on 8/18/2020.


     


FINDINGS:    





TUBES and LINES:  None.





LUNGS:  Normal lung volumes. There is hazy opacification the bilateral lung


bases, right more to left.





PLEURA:  No pleural effusion or pneumothorax.





HEART AND MEDIASTINUM:  The cardiomediastinal silhouette is unremarkable.    





BONES AND SOFT TISSUES:  No acute osseous lesion.  Soft tissues are


unremarkable.





UPPER ABDOMEN: No free air under the diaphragm.    





IMPRESSION: 





Hazy opacification of bilateral lung bases, right more to left which may


represent aspiration and/or developing multifocal pneumonia in the proper


clinical setting.





Signed by: Kandice Spring MD on 8/27/2020 8:23 PM








Dictated By: KANDICE SPRING MD


Electronically Signed By: KANDICE SPRING MD on 08/27/20 2023


Transcribed By: VALDEMAR on 08/27/20 2023 








COPY TO:   JERARDO CORDOBA MD~





Assessment & Plan


Medical Decision Making


MDM


pt with urinary symptoms for 4 weeks now with c/o feeling worse and now with 

generalized weakness, pt noted to be febrile on arrival to er


meets sirs criteria of temp 102.9 and heart rate 94





cbc, cmp, ua, urine culture, blood cultures, cxr, lactic acid ordered to eval 

for leukocytosis, sepsis, uti, pneumonia, electrolyte abnormality, 





cefepime 2 grams iv ordered


tylenol 975 mg po ordered


ns 1 liter iv bolus ordered





Patient found at 19,000 white count patient also found to have bibasilar 

infiltrates on chest x-ray so now Covid is a possibility on this patient. 

However superimposed bacterial infection is not ruled out. Initial lactic acid 

negative. COVID 19 TEST ORDERED





COVID 19 TEST NEGATIVE





I SPOKE WITH DR OLIVA ADMIT TO INPATIENT, I SPOKE WITH DR WHITE (PT'S 

UROLOGIST) FOR CONSULTATION ON UTI





Assessment & Plan


Final Impression:  


(1) UTI (urinary tract infection)


(2) Sepsis


(3) Pneumonia


(4) Hyponatremia


Depart Disposition:  ADMITTED


Last Vital Signs











  Date Time  Temp Pulse Resp B/P (MAP) Pulse Ox O2 Delivery O2 Flow Rate FiO2


 


8/27/20 18:48 102.9 93 20 125/70 100 Room Air  








Home Meds


Reported Medications


Metformin Hcl (METFORMIN HCL) 500 Mg Tablet, 500 MG PO BID, #60 TAB


   7/27/15


Tamsulosin Hcl (TAMSULOSIN HCL) 0.4 Mg Cap.er.24h, 0.4 MG PO DAILY


   7/27/15


Solifenacin Succinate (VESICARE) 5 Mg Tablet, 5 MG PO DAILY, #30 TAB


   7/27/15


Fenofibrate Nanocrystallized (FENOFIBRATE) 145 Mg Tablet, 145 MG PO DAILY


   7/27/15


Minocycline Hcl (MINOCYCLINE HCL) 50 Mg Capsule, 100 MG PO DAILY, #60 TAB


   7/27/15


[Synthroid]   No Conflict Check, 125 MCG PO DAILY


   8/13/12


Lansoprazole (PREVACID SOLUTAB) 30 Mg Tabdp, 30 MG PO DAILY


   8/13/12


[Avodart]   No Conflict Check, 5 MG PO DAILY


   8/13/12


[Amarayl]   No Conflict Check, 2 MG PO DAILY


   8/13/12


[Lisinopril]   No Conflict Check, 5 MG PO DAILY


   8/13/12


[Neurontin]   No Conflict Check, 600 MG PO DAILY


   8/13/12


Medications in the ED





Acetaminophen 975 mg ONCE  ONCE PO  Last administered on 8/27/20at 19:01; Admin 

Dose 975 MG;  Start 8/27/20 at 19:00;  Stop 8/27/20 at 19:01;  Status DC


Sodium Chloride 1,000 ml @  999 mls/hr Q1H1M ONCE IV  Last administered on 

8/27/20at 19:09; Admin Dose 999 MLS/HR;  Start 8/27/20 at 19:00;  Stop 8/27/20 

at 20:00


Cefepime HCl 100 ml @  200 mls/hr ONCE  ONCE IV  Last administered on 8/27/20at 

19:09; Admin Dose 200 MLS/HR;  Start 8/27/20 at 19:00;  Stop 8/27/20 at 19:29











JERARDO CORDOBA MD        Aug 27, 2020 19:17

## 2020-08-27 NOTE — XMS REPORT
Clinical Summary

                             Created on: 2020



Ziyad Whitt

External Reference #: [id number]

: 1947

Sex: Male



Demographics





                          Address                   [address] [id number]

 

                          Home Phone                [phone]

 

                          Preferred Language        Unknown

 

                          Marital Status            Unknown

 

                          Scientology Affiliation     Unknown

 

                          Race                      Unknown

 

                          Ethnic Group              Unknown





Author





                          Author                    [facility]

 

                          Address                   Unknown

 

                          Phone                     Unavailable







Care Team Providers





                    Care Team Member Name Role                Phone

 

                          PCP                       Unavailable







Allergies

Not on File



Medications

Not on file



Active Problems





Not on file



Social History





                                        Date



                 Tobacco Use     Types           Packs/Day       Years Used 

 

                                         



                                         Never Assessed    







 



                           Sex Assigned at Birth     Date Recorded

 

 



                                         Not on file 







                                        Industry



                           Job Start Date            Occupation 

 

                                        Not on file



                           Not on file               Not on file 







                                        Travel End



                           Travel History            Travel Start 

 





                                         No recent travel history available.







Last Filed Vital Signs

Not on file



Plan of Treatment





   



                 Health Maintenance  Due Date        Last Done       Comments

 

   



                           COLON CANCER SCREENING    1947  



                                         COLONOSCOPY   

 

   



                           PNEUMOCOCCAL 65+          11/10/2012  



                                         LOW/MEDIUM RISK (1 of 2 -   



                                         PCV13)   

 

   



                           INFLUENZA VACCINE (#1)    2020  







Results

Not on fileafter 2019

## 2020-08-28 NOTE — NUR
PATIENT IS AWAKE, ALERT AND IN STABLE CONDITION WITH NO S/S OF RESPIRATORY DISTRESS- PATIENT 
DENIES PAIN. TELEMETRY APPLIED. O2 APPLIED AT 1.5 L NC. IV FLUIDS INFUSING.. CALL LIGHT IS 
WITHIN REACH- PATIENT INSTRUCTED TO CALL FOR ASSISTANCE AS NEEDED.

## 2020-08-28 NOTE — CONSULTATION
DATE OF CONSULTATION:    

 

CHIEF COMPLAINT:  Fevers and painful urination.

 

HISTORY OF PRESENT ILLNESS:  The patient is a 72-year-old man.  He has fever for several

days to week along with dysuria.  He notes some congestion and some cough.  He came to

the emergency department and was found to have E. coli urinary tract infection with

secondary sepsis. 

 

PAST SURGICAL HISTORY:  

1. Right rotator cuff repair.

2. Left shoulder surgery.

3. Thyroidectomy.

4. Left heart catheterization, which showed some minimal coronary artery disease 5 years

ago. 

 

PAST MEDICAL HISTORY:  

1. Hypertension.

2. Diabetes.

3. Benign prostatic hypertrophy.

 

FAMILY HISTORY:  History of CVA and BPH.

 

SOCIAL HISTORY:  The patient quit smoking 30 years ago.  He rarely drinks alcohol.

 

ALLERGIES:  THE PATIENT REPORTS AN ALLERGY TO PENICILLIN AND CONTRAST DYE.

 

REVIEW OF SYSTEMS:

He has fevers at home.  There is no headache.  He has no neck pain.  He has no chest

pain.  He does not complain of any abdominal pain, nausea, or vomiting.  He has some

congestion and some cough.  He has no leg edema.  He does have some dysuria. 

 

PHYSICAL EXAMINATION:

VITAL SIGNS:  The blood pressure is 127/66 and saturation is 95% on room air. 

HEENT:  Shows no facial swelling or erythema. 

LYMPHATIC:  Normal. 

CARDIAC:  Reveals regular rate and rhythm with normal S1 and S2. 

LUNGS:  Auscultation of lungs reveals rhonchorous breath sounds bilaterally.  There is

no wheezing. 

ABDOMEN:  Soft and nontender.  There is no rebound or guarding. 

EXTREMITIES:  Show no leg edema or calf tenderness.  There is no cyanosis or clubbing. 

SKIN:  Shows no rashes.

LABORATORY DATA:  White blood cell count is 21.8 and the hemoglobin is 12.2.  The

platelet count is 491.  The BUN to creatinine ratio is 20 to 1.3.  Sodium is 129 and

carbon dioxide is 18.  Albumin is 2.5. 

 

RADIOGRAPHIC DATA:  Chest x-ray shows bilateral infiltrates, more on the right.

 

IMPRESSION:  

1. Urinary tract infection with gram-negative rods and sepsis, present on admission.

2. Community-acquired pneumonia.

3. Benign prostatic hypertrophy.

4. Acute kidney injury.

5. Hypertension.

6. Diabetes.

 

PLAN:  

1. Continue cefepime along with azithromycin to cover both for urinary tract infection

and community-acquired pneumonia. 

2. Urology consultation with treatment for BPH.

3. The patient has received IV fluids in accordance with sepsis protocol.

4. Continue to monitor and control blood sugars.

5. CT of chest without contrast.

 

 

 

 

______________________________

MD MOMO Chaves/MODL

D:  08/28/2020 13:53:05

T:  08/28/2020 14:08:27

Job #:  [id number]

## 2020-08-28 NOTE — NUR
Patient received lying in bed. AAO x 3. Patient had no complaints of pain. Respirations even 
and non-labored. Safety measures in place. Patient instructed to call for assistance when 
needed. Call light within reach.

## 2020-08-28 NOTE — NUR
PATIENT IS IN STABLE CONDITION WITH NO S/S OF RESPIRATORY DISTRESS. NO PAIN VOICED. IV 
FLUIDS INFUSING. TELEMETRY APPLIED. URINALS PROVIDED TO PATIENT.. CALL LIGHT IS WITHIN 
REACH, PATIENT INSTRUCTED TO CALL FOR ASSISTANCE AS NEEDED. REPORT GIVEN TO ONCOMING NURSE.

## 2020-08-28 NOTE — HISTORY AND PHYSICAL
PRIMARY CARE PHYSICIAN:  Dr. Masood Meza.

 

CONSULTING PHYSICIANS:  

1. Dr. Mandeep Burns with Urology.

2. Dr. David Araiza with Pulmonology.

 

CHIEF COMPLAINT:  Painful urination.

 

HISTORY OF PRESENT ILLNESS:  The patient is a 72-year-old  male with a 3 to 4

week history of dysuria and frequency, presenting to the emergency department with a

temperature of 102.9, who was seen in the Emergency Department at [facility] on 08/18/2020, with the same signs and symptoms.  He denies being seen at any or

Urgent Care center by his PCP since that previous admission.  He denies use of

antibiotics at home since his discharge.  Denies any sick contacts.  Denies being around

anyone with Coronavirus that he is aware. 

 

PAST MEDICAL HISTORY:  Hypertension, diabetes mellitus, hyperlipidemia, UTIs,

hypothyroidism, neuropathy, BPH, minimal coronary artery disease. 

 

PAST SURGICAL HISTORY:  

1. Right rotator cuff repair.

2. Left rotator cuff repair on 08/14/2012.

3. Thyroidectomy.

4. On 02/29/2012, left inguinal hernia repair by Dr. Sawyer Cope also in the left

rotator cuff repair that was done on 08/14/2012 by Dr. Roman Pastor. 

5. On 07/28/2015, left heart catheterization with ejection fraction of 60% and minimal

coronary artery disease. 

 

FAMILY HISTORY:  Father had BPH in his 60s.  He had a CVA.

 

SOCIAL HISTORY:  The patient lives with his wife.  He denies any illicit drug use.

Denies any current tobacco use.  He stopped smoking in his 40s.  He smoked one pack per

week from age 14 to age 40, i.e. 26 years.  He is retired.  He drinks alcohol on

occasion, [employer]/bourbon. 

 

ALLERGIES:  PENICILLIN, LEVOFLOXACIN, IVP DYE.

 

HOME MEDICATIONS:  Questionable home medication list.  However, what is included is

fenofibrate 145 mg daily, metformin 500 mg b.i.d., minocycline 100 mg daily, VESIcare 5

mg daily, tamsulosin 0.4 mg daily, Prevacid 30 mg daily, Amaryl 2 mg daily, Avodart 5 mg

daily, lisinopril 5 mg daily, Neurontin 600 mg daily, Synthroid 125 mcg daily. 

 

REVIEW OF SYSTEMS:

CONSTITUTIONAL:  The patient denies any recent weight loss or weight gain.  Denies

having chills previously, but currently has chills.  He has had an elevated temperature

for several days and generalized weakness.  No fever at home, but admitted with a fever

of 102.9.  He has had frequency and dysuria, although unknown diabetic.  He does not

check his blood glucose at home.  His last bowel movement was 08/26/2020. 

 

A 14-point review of systems was completed.  The patient denies any current problems

with the following systems, eyes, ears, nose, throat, respiratory, psychiatric,

integumentary, cardiovascular, gastrointestinal, musculoskeletal, neurologic,

allergic/immunological, hematologic/lymphatic. 

 

PHYSICAL EXAMINATION:

VITAL SIGNS:  Temperature 97.8, T-max 102.9, pulse 65, blood pressure 103/69, it was

125/70 on admission, respirations 16, and oxygen saturation 97% at the time of

encounter.  SpO2 of 86% on room air and oxygen applied by respiratory therapist.  Height

5 feet 5 inches.  Weight 175 pounds.  BMI 29.11. 

GENERAL:  Supine, in no acute distress with yannick. 

LUNGS:  Bibasilar lung sounds diminished.  No elevated work of breathing.  Respirations

unlabored.  Supplemental oxygen of 2 L/min via nasal cannula. 

HEENT:  EOMI.  Neck is supple.  No thyromegaly, lymphadenopathy, or JVD noted. 

CARDIOVASCULAR:  Regular rate and rhythm without murmur.  Normal saline IV fluids via

peripheral IV. 

ABDOMEN:  Bowel sounds positive.  Soft, nontender.  No guarding. 

EXTREMITIES:  No pitting edema.  No clubbing, cyanosis, or signs of DVT. 

NEUROLOGICAL:  GCS is 15.  Nonfocal.

LABORATORY DATA:  WBC 19.14, hemoglobin 13, hematocrit 39.9, platelets 518, neutrophils

84.7%.  PT 13.3, INR 0.96, PTT 34.2.  Sodium 123, potassium 5, chloride 92, serum CO2 of

17, anion gap 19, BUN 22, creatinine 1.45, estimated GFR 48, BUN and creatinine ratio

15, glucose 172, lactic acid 1.2, calcium 10.1, total bilirubin 0.7, AST 54, ALT 71,

alkaline phosphatase 94, total protein 8.2, albumin 2.8.  Urinalysis was completed

showing yellow urine, slightly cloudy clarity, pH 5, specific gravity 1.01, protein

trace, negative for glucose, negative for ketones, trace amount of blood, negative for

nitrites, moderate amount of leukocyte esterase, negative for bilirubin, urobilinogen

0.2, rbc's 0 to 5, wbc's 21 to 50, bacteria many.  Coronavirus PCR not detected on

08/27/2020.  Blood cultures x2 and urine culture collected and are pending. 

 

IMAGING/OTHER:  Chest x-ray per radiologist impression is hazy opacification of

bilateral lung bases, right more than left, which may represent aspiration and/or

developing multifocal pneumonia in the proper clinical setting.  A 12-lead ECG was not

completed. 

 

ASSESSMENT AND PLAN:  

1. Severe sepsis, POA, (with acute kidney injury) due to urinary tract infection and

bilateral multifocal pneumonia.  WBC 19.1, lactic acid 1.2, neutrophils 84.7%, T-max

102.9.  Known penicillin allergy.  Continue IV antibiotics, azithromycin, and cefepime.

Monitor WBCs.  Monitor for subjective improvement. 

2. Acute urinary tract infection, POA, with urinary retention and benign prostatic

hyperplasia.  Urology consulted.  Continue IV cefepime, normal saline, IV fluids.  Await

final urine culture and sensitivity results. 

3. Bilateral multifocal pneumonia, POA.  Continue IV azithromycin and cefepime.  DuoNebs

q.4 hours while awake, have been ordered as well as Mucinex DM b.i.d.  Pulmonology

consulted.  Monitor any chest x-ray results.  Supplemental oxygen to keep oxygen

saturation greater than or equal to 94%. 

4. Acute kidney injury.  BUN 22, creatinine 1.45, estimated GFR 48.  Continue IV

hydration.  Monitor renal labs.  Avoid nephrotoxic agents. 

5. Acute hyponatremia and hypochloremia.  Sodium 123, chloride 92.  Continue IV normal

saline.  Monitor electrolytes. 

6. Metabolic acidosis.  Serum bicarbonate 17.  Monitor for improvement with IV

antibiotics and IV fluids.  Consider addition of sodium bicarbonate and IV fluids if no

improvement. 

7. Hypotension due to number #1, history of hypotension.  Current blood pressure 103/69.

 Monitor for improvement with IV fluids and IV antibiotics.  Consider use of vasoactive

drip such as Levophed if improvement is unsatisfactory. 

8. Uncontrolled type 2 diabetes mellitus with hyperglycemia and diabetic neuropathy.

Serum glucose 172.  Monitor fingerstick blood glucose levels before meals and at

bedtime.  Resume any oral diabetic medications from home once medications have been

reviewed with the patient, so that medication reconciliation can occur.  Continue ADA

diet.  Hemoglobin A1c in the morning.  Fingerstick blood glucose levels before meals and

at bedtime. 

9. Hypothyroidism.  TSH in the morning.  Resume home dose of levothyroxine.

10. Prophylaxis.  Pepcid and SCDs. 

 

H and P, billing code [id number].  Time spent 60 minutes.

 

 

Dictated by Dung Scott, NP

 

______________________________

MD BHAVYA Arambula/JASPAL

D:  08/28/2020 01:50:42

T:  08/28/2020 03:36:55

Job #:  [id number]

## 2020-08-28 NOTE — CONSULTATION
DATE OF CONSULTATION:  08/28/2020

 

Urology Consultation

 

Consultation was called by Emergency Room.

 

CHIEF COMPLAINT/REASON FOR CONSULTATION:  Urinary tract infection.

 

HISTORY OF PRESENT ILLNESS:  Mr. Whitt is a 72-year-old male patient of mine, admitted

to the hospital with dysuria and a fever of 102.9.  Denied gross hematuria, denied any

flank pain. 

 

PAST MEDICAL HISTORY:  Significant for hypertension, diabetes, hyperlipidemia, urinary

tract infections, BPH, neuropathy, hypothyroidism, coronary artery disease, status post

previous cath in 2015, status post left inguinal hernia repair in 2012, status post

thyroidectomy, status post left rotator cuff repair in 2012, status post right rotator

cuff repair. 

 

MEDICATIONS:  Please see MAR.

 

ALLERGIES:  PENICILLIN, LEVOFLOXACIN, AND IVP DYE.

 

SOCIAL HISTORY:  Denies smoking or drinking.  Quit smoking in his 40s, had a

26-pack-year smoking history.  Positive social alcohol. 

 

FAMILY HISTORY:  BPH.  Denied urologic stones or malignancies.

 

REVIEW OF SYSTEMS:

Noncontributory other than problems mentioned above for 12-organ systems other than

fevers. 

 

PHYSICAL EXAMINATION:

GENERAL:  Elderly male, in no distress currently. 

TEMPERATURE:  102, pulse 94, respirations 20, and blood pressure 125/71. 

HEENT:  Sclerae anicteric. 

NECK:  Supple. 

BACK:  Without costovertebral laterally. 

ABDOMEN:  Soft, it is nontender, it is nondistended.  No palpable mass.  No palpable

hernias.  No palpable adenopathy. 

:  Normal external genitalia. 

EXTREMITIES:  No edema. 

NEURO:  Moves all extremities. 

PSYCH:  Alert and mood appropriate. 

SKIN:  Intact.  Normal color.

PERTINENT LABORATORY DATA:  CT scan on August 18th revealed a left renal cyst, BPH,

fatty liver.  Urinalysis 21 to whites, 5 reds.  Sodium 129, potassium 4.3, chloride 97,

bicarb 18, BUN 20, creatinine 1.3, glucose 186.  Hemoglobin 12, hematocrit 35, platelet

count 491,000, and white blood cell count 21,800. 

 

IMPRESSION:  

1. Urinary tract infection.

2. Microscopic hematuria.

3. Benign prostatic hypertrophy.

4. Renal cyst.

5. Clinical signs of sepsis.

 

PLAN:  The patient has been begun on empiric antibiotics.  We will adjust culture

specific antibiotics as data is available, surveillance for the cyst.  The patient is

voiding well. 

 

Thank you for allowing us to participate in the care of the patient.  We will be happy

to follow along with you. 

 

 

 

 

______________________________

MD KERRI Tariq/MODL

D:  08/28/2020 08:04:32

T:  08/28/2020 09:07:09

Job #:  [id number]

 

cc:            MD Masood Chaves [facility] 

 

               Emergency Room 

 

               Saul Villar MD

## 2020-08-28 NOTE — DIAGNOSTIC IMAGING REPORT
EXAM: CT Chest WITHOUT contrast

INDICATION: Evaluate for pneumonia

COMPARISON: Chest x-ray on 8/27/2020.



TECHNIQUE:

Chest was scanned utilizing a multidetector helical scanner from the lung apex

through the level of the adrenal glands without administration of IV contrast.

Absence of intravenous contrast decreases sensitivity for detection of

lymphadenopathy and vascular pathology. Coronal and sagittal reformations were

obtained. Routine protocol was performed.

     IV CONTRAST: None



COMPLICATIONS: None   



RADIATION DOSE: 

     Total DLP: 465 mGy*cm

     Estimated effective dose: (DLP x 0.014 x size factor) mSv

     CTDIvol has been reviewed. It is below the limits set by the Radiation

Protocol Committee (RPC).

     Dose modulation, iterative reconstruction, and/or weight based adjustment

of the mA/kV was utilized to reduce the radiation dose to as low as reasonably

achievable. 

           

FINDINGS:



LINES/ TUBES: None.



LUNGS, AIRWAYS and PLEURA: Evaluation of the lungs is severely limited by

respiratory motion artifact, however, within the confines of this limitations

there are no focal consolidation, pleural effusion or pneumothorax. There is

bibasilar atelectasis. No suspicious large mass identified. The central airways

are patent and the trachea is midline.  



HEART AND MEDIASTINUM: The thyroid gland is normal. . No mediastinal, hilar or

axillary lymphadenopathy.  The heart is normal in size with atherosclerotic

calcification of the coronary vessels.. There is no pericardial effusion.     



UPPER ABDOMEN: Unremarkable.



BONES: There are degenerative changes in the thoracic spine. Suture anchor in

the right humeral head.



SOFT TISSUES: Unremarkable.



IMPRESSION:

Evaluation of the lungs is severely limited by respiratory motion artifact.

However, within the confines of this limitations there are no focal

consolidation, pleural effusion or pneumothorax.



Signed by: Kandice Carlson MD on 8/28/2020 7:02 PM

## 2020-08-29 NOTE — NUR
pt refusing to get oob due to feeling nauseated but says he walks and goes to the  on his 
own, f/u on monday

-------------------------------------------------------------------------------

Addendum: 08/29/20 at 1623 by Clayton Norton PTA

-------------------------------------------------------------------------------

Amended: Links added.

## 2020-08-29 NOTE — PROGRESS NOTE
DATE:    

 

SUBJECTIVE:  The patient's CT scan shows no infiltrates.  Overall, he feels improved.

His urine is growing out E coli. 

 

PHYSICAL EXAMINATION:

VITAL SIGNS:  The patient is afebrile.  The blood pressure is 120/60 and the pulse is

67. 

HEENT:  Shows no facial swelling or erythema. 

LYMPHATIC:  Shows no submandibular, cervical, or supraclavicular adenopathy. 

CARDIAC:  Reveals regular rate and rhythm with normal S1, S2. 

LUNGS:  Auscultation of lungs reveals rhonchorous breath sounds bilaterally.  There is

no wheezing. 

ABDOMEN:  Soft, nontender.  There is no rebound or guarding. 

EXTREMITIES:  Shows no leg edema or calf tenderness.  There is no cyanosis or clubbing. 

SKIN:  Shows no rashes. 

NEUROLOGIC:  Shows no focal abnormalities.

LABORATORY DATA:  White blood cell count is 18.1, hemoglobin is 10.7, and the platelet

count is 458.  BUN to creatinine ratio is 17 to 1.14.  Electrolytes are within normal

limits. 

 

IMPRESSION:  

1. Escherichia coli urinary tract infection with sepsis, present on admission.

2. Benign prostatic hypertrophy.

3. Acute kidney injury.

4. Diabetes.

5. Hypertension.

 

PLAN:  

1. Continue cefepime.  We will switch to p.o. antibiotics and complete the full course

once the patient's leukocytosis has resolved and his renal function returns normal. 

2. Continue to monitor renal function.

3. Continue to monitor and control blood sugars.

 

 

 

 

______________________________

MD MOMO Chaves/JASPAL

D:  08/29/2020 13:01:43

T:  08/29/2020 13:10:08

Job #:  [id number]

## 2020-08-29 NOTE — NUR
BEDSIDE SHIFT REPORT GIVEN TO ONCOMING NURSE. PATIENT IS IN STABLE CONDITION. DENIES PAIN OR 
DISCOMFORT AT THIS TIME. CALL LIGHT WITHIN REACH. BED IN THE LOWEST POSITION.

## 2020-08-29 NOTE — NUR
Date of Service:  8/29/2020



PRIMARY CARE PHYSICIAN:  Dr. Masood Meza.

 

ATTENDING PHYSICIAN:  Dr. Saul Villar



CONSULTING PHYSICIANS:  

1. Dr. Mandeep Burns with Urology.

2. Dr. David Araiza with Pulmonology.

 

CHIEF COMPLAINT:  Painful urination.

 

HISTORY OF PRESENT ILLNESS:  The patient is a 72-year-old  male with a 
3 to 4

week history of dysuria and frequency, presenting to the emergency department 
with a

temperature of 102.9, who was seen in the Emergency Department at [facility] on 08/18/2020, with the same signs and symptoms.  He denies being seen 
at any or

Urgent Care center by his PCP since that previous admission.  He denies use of

antibiotics at home since his discharge.  Denies any sick contacts.  Denies 
being around

anyone with Coronavirus that he is aware. 

 

REVIEW OF SYSTEMS:

Denies chills.  Reports less frequency and dysuria, able to urinate normally 
now.  Diarrhea for two weeks; BM x 3 today.

 

PHYSICAL EXAMINATION:

VITAL SIGNS:  Temperature 97.3, T-max 102.0, pulse 82, 115/67, respirations 20, 
and SpO2 98% on room air. Height 5 feet 5 inches.  Weight 175 pounds.  BMI 
29.11. 

GENERAL:  Supine, in no acute distress with yannick. 

LUNGS:  Bibasilar lung sounds diminished.  No elevated work of breathing.  
Respirations

unlabored.  No supplemental oxygen. 

HEENT:  EOMI.  Neck is supple.  No thyromegaly, lymphadenopathy, or JVD noted. 

CARDIOVASCULAR:  Regular rate and rhythm without murmur.  Normal saline IV 
fluids via

peripheral IV. 

ABDOMEN:  Bowel sounds positive.  Soft, nontender.  No guarding. 

EXTREMITIES:  No pitting edema.  No clubbing, cyanosis, or signs of DVT. 

NEUROLOGICAL:  GCS is 15.  Nonfocal.



DIAGNOSTIC STUDIES

LABORATORY DATA:  Reviewed. 

WBC 18.1 (21.81, 19.14), hemoglobin 10.7 (13), hematocrit 35, platelets 458 
(518), neutrophils 81.8%.

Sodium 131 (129, 123), potassium 4 (4.3, 5), chloride 101 (97, 92), serum CO2 
of

19 (18, 17), BUN 17, creatinine 1.14 (1.30, 1.45), estimated GFR >60 (54, 48), 
glucose 98. 



8/28 lactic acid 1.2

8/28 Urinalysis: yellow urine, slightly cloudy clarity, pH 5, specific gravity 
1.01, protein

trace, negative for glucose, negative for ketones, trace amount of blood, 
negative for

nitrites, moderate amount of leukocyte esterase, negative for bilirubin, 
urobilinogen

0.2, rbc's 0 to 5, wbc's 21 to 50, bacteria many.  

8/27 Coronavirus PCR negative

8/27 Blood cultures x2 collected and are pending. 

8/27 Final urine culture and sensitivity results:  

     Organism 1                     ESCHERICHIA COLI

        COLONY COUNT                >=100,000 cfu/ml



                                    ESC COLI          

                                    M.I.C.     RX     

                                    --------- ----    

     TRIMETHOPRIM/SULFAMETHOXAZOLE  >2/38      R      

     AMPICILLIN                     >16        R      

     AMPICILLIN/SULBACTAM(UNASYN).  >16/8      R      

     AZTREONAM                      <=4        S      

     CEFAZOLIN (ANCEF)              >16        R      

     CEFOTAXIME (CLAFORAN)          <=2        S      

     CEFTAZIDIME                    <=1        S      

     CEFTRIAXONE (ROCEPHIN)         <=1        S      

     CEFEPIME (MAXIPIME)            <=4        S      

     CEFUROXIME                     <=4        S      

     CIPROFLOXACIN (CIPRO)          <=1        S      

     ERTAPENEM (INVANZ)             <=1        S      

     GENTAMICIN (GARAMYCIN)         <=4        S      

     IMIPENEM (PRIMAXIN)            <=1        S      

     LEVOFLOXACIN (LEVAQUIN)        <=2        S      

     MEROPENEM (MERREM)             <=1        S      

     NITROFURANTOIN (MACRODANTIN)   <=32       S      

     TOBRAMYCIN                     <=4        S      

     AMIKACIN                       <=16       S      

     PIPERACILLIN/TAZOBACT (ZOSYN)  >64        R      



     S  = SUSCEPTIBLE

     I  = INTERMEDIATE

     R  = RESISTANT

 

IMAGING/OTHER:  

8/28 CT Chest:  Evaluation of the lungs is severely limited by respiratory 
motion artifact. However, within the confines of this limitations there are no 
focal consolidation, pleural effusion or pneumothorax.



8/27 Chest x-ray per radiologist impression is hazy opacification of bilateral 
lung bases, right more than left, which may represent aspiration and/or 
developing multifocal pneumonia in the proper clinical setting.  



A 12-lead ECG was not completed. 

 





ASSESSMENT AND PLAN:  

1. Severe sepsis, POA, (with acute kidney injury) due to UTI:

-WBC improving. T-max 102.0. 

-Known PCN allergy.  Continue IV antibiotic Cefepime.

-Monitor WBCs.  Monitor for subjective improvement. 



2. Acute UTI with MDR E Coli, POA, with urinary retention & BPH: 

-Urology following.  

-Continue IV cefepime & NS IV fluids.  



3. Acute Diarrhea:  

-Reports Diarrhea for two weeks; BM x 3 today. 

-Assess Cdiff toxin. Start Questran BID. 



4. Possible Bilateral multifocal pneumonia:

-Case discussed with Dr. Araiza. Stop IV azithromycin, continue cefepime.  

-Stop DuoNebs.  

-Pulmonology following. 

-Monitor CXR results.  

-Supplemental oxygen prn to keep oxygen saturation greater than or equal to 
94%. 



5. EDGAR:  

-BUN 17, creatinine 1.14 (1.30, 1.45), estimated GFR >60 (54, 48) 

-Continue IV hydration.  Monitor renal labs.  Avoid nephrotoxic agents. 



6. Acute hyponatremia and hypochloremia:

-Sodium 131 (129, 123), chloride 101

Continue IV normal saline.  Monitor electrolytes. 



7. Metabolic acidosis.  

-Serum bicarbonate 19 (18, 17).  

-Monitor for improvement with IV antibiotics and IV fluids.  

-Consider addition of sodium bicarbonate in IV fluids if no improvement. 



8. Hypotension due to number #1, improving, +hx of HTN:

-blood pressure today 115/67

-Monitor for improvement with IV fluids and IV antibiotics.  

-Consider use of vasoactive drip such as Levophed if improvement is 
unsatisfactory. 



9. Controlled type 2 diabetes mellitus with hyperglycemia and diabetic 
neuropathy:

-Serum glucose 98.  

-Monitor FSBG ac hs.  

-Resume any oral diabetic medications from home once medications

-Continue ADA diet.  

-Hemoglobin A1c 7.3%

-Regular SSI

 

10. Hypothyroidism:

-TSH 6.377; on 8/29 Levothyroxine dose increased from 125 to 137mcg daily



Prophylaxis.  Pepcid and SCDs. 

 

Inpatient, billing code [id number], time spent 35 minutes.

## 2020-08-29 NOTE — NUR
Patient received lying in bed. AAO x 3. No complaints of pain. No signs of respiratory 
distress. Fall precautions implemented. Patient instructed to call for assistance when 
needed. Call light within reach.

## 2020-08-29 NOTE — NUR
RECEIVED BEDSIDE SHIFT REPORT FROM OFF GOING NURSE. PATIENT IS RESTING IN BED, DENIES PAIN 
OR DISCOMFORT. NO ACUTE DISTRESS NOTED. CALL LIGHT WITHIN REACH. BED IN THE LOWEST POSITION.

## 2020-08-30 NOTE — NUR
Report given to oncoming nurse of patients status. Resting in bed. AAOX3 to time, person, 
place. Respirations even and unlabored. Side rails upx2, call light within reach.

## 2020-08-30 NOTE — NUR
Date of Service:  8/30/2020



PRIMARY CARE PHYSICIAN:  Dr. Masood Meza.

 

ATTENDING PHYSICIAN:  Dr. Saul Villar



CONSULTING PHYSICIANS:  

1. Dr. Mandeep Burns with Urology.

2. Dr. David Araiza with Pulmonology.

 

CHIEF COMPLAINT:  Painful urination.

 

HISTORY OF PRESENT ILLNESS:  The patient is a 72-year-old  male with a 
3 to 4

week history of dysuria and frequency, presenting to the emergency department 
with a

temperature of 102.9, who was seen in the Emergency Department at [facility] on 08/18/2020, with the same signs and symptoms.  He denies being seen 
at any or

Urgent Care center by his PCP since that previous admission.  He denies use of

antibiotics at home since his discharge.  Denies any sick contacts.  Denies 
being around

anyone with Coronavirus that he is aware. 

 

REVIEW OF SYSTEMS:

Feeling much, much better. Denies chills.  Reports less frequency and dysuria, 
able to urinate normally now.  Diarrhea for two weeks.

 

PHYSICAL EXAMINATION:

VITAL SIGNS:  Temperature 98.6, T-max 100.1, pulse 58, 146/80, respirations 18, 
and SpO2 99% on room air. Height 5 feet 5 inches.  Weight 175 pounds.  BMI 
29.11. 

GENERAL:  Supine, in no acute distress with yannick. 

LUNGS:  Bibasilar lung sounds diminished.  No elevated work of breathing.  
Respirations unlabored.  No supplemental oxygen. 

HEENT:  EOMI.  Neck is supple.  No thyromegaly, lymphadenopathy, or JVD noted. 

CARDIOVASCULAR:  Regular rate and rhythm without murmur.  Normal saline IV 
fluids via

peripheral IV. 

ABDOMEN:  Bowel sounds positive.  Soft, nontender.  No guarding. 

EXTREMITIES:  No pitting edema.  No clubbing, cyanosis, or signs of DVT. 

NEUROLOGICAL:  GCS is 15.  Nonfocal.



DIAGNOSTIC STUDIES

LABORATORY DATA:  Reviewed. 

WBC 10.15 (18.1, 21.81, 19.14), H/H stable, platelets 524 (458, 518).

Sodium 132 (131, 129, 123), potassium 4.1 (4, 4.3, 5), chloride 99 (101, 97, 
92), Serum CO2 22 (19, 18, 17), BUN 14, creatinine 1.08 (1.14, 1.30, 1.45), 
estimated GFR >60, glucose 207. 



8/28 lactic acid 1.2

8/28 Urinalysis: yellow urine, slightly cloudy clarity, pH 5, specific gravity 
1.01, protein

trace, negative for glucose, negative for ketones, trace amount of blood, 
negative for

nitrites, moderate amount of leukocyte esterase, negative for bilirubin, 
urobilinogen

0.2, rbc's 0 to 5, wbc's 21 to 50, bacteria many.  

8/27 Coronavirus PCR negative

8/27 Blood cultures x2 collected and are pending. 

8/27 Final urine culture and sensitivity results:  

     Organism 1                     ESCHERICHIA COLI

        COLONY COUNT                >=100,000 cfu/ml



                                    ESC COLI          

                                    M.I.C.     RX     

                                    --------- ----    

     TRIMETHOPRIM/SULFAMETHOXAZOLE  >2/38      R      

     AMPICILLIN                     >16        R      

     AMPICILLIN/SULBACTAM(UNASYN).  >16/8      R      

     AZTREONAM                      <=4        S      

     CEFAZOLIN (ANCEF)              >16        R      

     CEFOTAXIME (CLAFORAN)          <=2        S      

     CEFTAZIDIME                    <=1        S      

     CEFTRIAXONE (ROCEPHIN)         <=1        S      

     CEFEPIME (MAXIPIME)            <=4        S      

     CEFUROXIME                     <=4        S      

     CIPROFLOXACIN (CIPRO)          <=1        S      

     ERTAPENEM (INVANZ)             <=1        S      

     GENTAMICIN (GARAMYCIN)         <=4        S      

     IMIPENEM (PRIMAXIN)            <=1        S      

     LEVOFLOXACIN (LEVAQUIN)        <=2        S      

     MEROPENEM (MERREM)             <=1        S      

     NITROFURANTOIN (MACRODANTIN)   <=32       S      

     TOBRAMYCIN                     <=4        S      

     AMIKACIN                       <=16       S      

     PIPERACILLIN/TAZOBACT (ZOSYN)  >64        R      



     S  = SUSCEPTIBLE

     I  = INTERMEDIATE

     R  = RESISTANT

 

IMAGING/OTHER:  

8/28 CT Chest:  Evaluation of the lungs is severely limited by respiratory 
motion artifact. However, within the confines of this limitations there are no 
focal consolidation, pleural effusion or pneumothorax.



8/27 Chest x-ray per radiologist impression is hazy opacification of bilateral 
lung bases, right more than left, which may represent aspiration and/or 
developing multifocal pneumonia in the proper clinical setting.  



A 12-lead ECG was not completed. 

 





ASSESSMENT AND PLAN:  

1. Severe sepsis, POA, (with acute kidney injury) due to UTI:

-WBC improving. T-max 100.1. 

-Known PCN allergy.  Continue IV antibiotic Cefepime.

-Monitor WBCs.  Subjectively improved. 



2. Acute UTI with MDR  EColi, POA, with urinary retention & BPH: 

-Urology following.  

-Continue IV cefepime & NS IV fluids.  



3. Acute Diarrhea:  

-Reports Diarrhea for two weeks; BM x 3 today. 

-Assess Cdiff toxin. Start Questran BID. 



4. Possible Bilateral multifocal pneumonia:

-Case discussed with Dr. Araiza. Stop IV azithromycin, continue cefepime.  

-Stop DuoNebs.  

-Pulmonology following. 

-Monitor CXR results.  

-Supplemental oxygen prn to keep oxygen saturation greater than or equal to 
94%. 



5. EDGAR:  

-BUN 17, creatinine 1.08 (1.14, 1.30, 1.45), estimated GFR >60 

-Continue IV hydration.  Monitor renal labs.  Avoid nephrotoxic agents. 



6. Acute hyponatremia and hypochloremia:

-Sodium 132 (131, 129, 123), chloride 99

Continue IV normal saline.  Monitor electrolytes. 



7. Metabolic acidosis.  

-Serum bicarbonate 22 (19, 18, 17).  

-Monitor for improvement with IV antibiotics and IV fluids.  

-Consider addition of sodium bicarbonate in IV fluids if no improvement. 



8. Hypotension due to number #1, improving, +hx of HTN:

-blood pressure today 146/80

-Monitor for improvement with IV fluids and IV antibiotics.  

-Consider use of vasoactive drip such as Levophed if improvement is 
unsatisfactory. 



9. Controlled type 2 diabetes mellitus with hyperglycemia and diabetic 
neuropathy:

-Serum glucose 207.  

-Monitor FSBG ac hs.  

-Resume any oral diabetic medications from home once medications

-Continue ADA diet.  

-Hemoglobin A1c 7.3%

-Regular SSI

 

10. Hypothyroidism:

-TSH 6.377; on 8/29 Levothyroxine dose increased from 125 to 137mcg daily



Prophylaxis.  Pepcid and SCDs.



Inpatient, billing code [id number], time spent 35 minutes.

## 2020-08-30 NOTE — NUR
Patient received lying in bed. AAO x 3. No acute distress noted. Bed locked and in lowest 
position. Bed rails up x 2. Safety measures in place. Patient instructed to call for 
assistance when needed. Call light within reach.

## 2020-08-30 NOTE — NUR
Discussed pt with Dung Scott NP. He stated pt is doing better today. Will monitor, and 
plans discharge Monday.

## 2020-08-30 NOTE — PROGRESS NOTE
DATE:    

 

SUBJECTIVE:  The patient is currently feeling better.  He is afebrile.  He is not

complaining of dyspnea or cough. 

 

PHYSICAL EXAMINATION:

VITAL SIGNS:  Blood pressure is 137/71, saturation is 100% and the pulse is 55-65. 

HEENT:  No facial swelling or erythema. 

LYMPHATIC:  No submandibular, cervical, or supraclavicular adenopathy. 

CARDIAC:  Regular rate and rhythm with normal S1, S2. 

LUNGS:  Auscultation of lungs reveals rhonchorous breath sounds bilaterally.  There is

no wheezing. 

ABDOMEN:  Soft, nontender.  There is no rebound or guarding. 

EXTREMITIES:  No leg edema or calf tenderness.  There is no cyanosis or clubbing. 

SKIN:  No rashes. 

NEUROLOGICAL:  No focal abnormalities.

LABORATORY DATA:  White blood cell count is 10.1, hemoglobin is 10.6, and the platelet

count is 524.  The BUN to creatinine ratio is 14 to 1.08.  The other electrolytes within

normal limits.  Albumin is 2.1. 

 

IMPRESSION:  

1. Escherichia coli urinary tract infection with sepsis, present on admission.

2. Benign prostatic hypertrophy.

3. Acute kidney injury.

4. Diabetes.

 

PLAN:  

1. Continue cefepime.  Continue IV antibiotics for now.

2. Hopefully, the patient can be switched to p.o. antibiotics and complete his course of

antibiotics orally as an outpatient.  We will discuss with Urology. 

3. Continue treatment for benign prostatic hypertrophy.

4. Continue to monitor and control blood sugars.

5. No further interventions are required for a pulmonary perspective at this time.

 

 

 

 

______________________________

MD MOMO Chaves/JASPAL

D:  08/30/2020 12:00:17

T:  08/30/2020 12:22:44

Job #:  [id number]

## 2020-08-31 NOTE — DISCHARGE SUMMARY
PRIMARY CARE PHYSICIAN:  Dr. Masood Meza.

 

CONSULTING PHYSICIANS:  

1. Dr. Mandeep Burns with Urology.

2. Dr. David Araiza with Pulmonology.

 

CHIEF COMPLAINT:  Painful urination.

 

HISTORY OF PRESENT ILLNESS:  The patient is a 72-year-old  male with 3 to 4-week

history of dysuria and frequency, presenting to the emergency department with a

temperature of 102.9, who was seen previously in the Emergency Department at [facility] on 2020, with the same signs and symptoms.  He denies being seen at

any Urgent Care Center or by his PCP since the previous admission, and he denies use of

antibiotics at home since his discharge.  He denies any sick contacts.  Denies being

around anyone with a Coronavirus that he is aware of.  Please see dictated history and

physical, dated on 2020 for list of past medical history, past surgical history,

family history, social history, allergies, home medications, as well as admitting

laboratory data and imaging studies. 

 

ADMITTING DIAGNOSES:  Include the followin. Severe sepsis, present on admission (with acute kidney injury) due to urinary tract

infection and bilateral multifocal pneumonia. 

2. Acute urinary tract infection, present on admission, with urinary retention and

benign prostatic hyperplasia. 

3. Bilateral multifocal pneumonia, present on admission.

4. Acute kidney injury.

5. Acute hyponatremia and hypochloremia.

6. Metabolic acidosis.

7. Hypotension due to #1, history of hypotension.

8. Uncontrolled type 2 diabetes mellitus with hyperglycemia and diabetic neuropathy.

9. Hypothyroidism.

 

DISCHARGE DIAGNOSES:  

1. Severe sepsis, present on admission, (with acute kidney injury) due to urinary tract

infection, resolved. 

2. Acute urinary tract infection with multidrug resistant Escherichia coli, present on

admission, with urinary retention and benign prostatic hyperplasia. 

3. Acute diarrhea, improving.

4. Possible bilateral multifocal pneumonia.

5. Acute kidney injury.

6. Acute hyponatremia and hypochloremia, improving.

7. Metabolic acidosis due to #1.

8. Hypotension due to #1, improving, positive history of hypertension.

9. Controlled type 2 diabetes mellitus with hyperglycemia and diabetic neuropathy.

10. Hypothyroidism. 

 

The patient's white blood cell count improved from a high of 21.81 down to 10.15

yesterday, was given an additional day in the hospital to fully improve to the white

blood cell count of today 7.2.  The patient was on cefepime, IV antibiotics during his

stay here.  Coronavirus PCR was negative on 2020.  The blood cultures x2 collected

on 2020, with preliminary results of no growth.  After 72 hours __________ showed

preliminary results of gram-negative bacillus, identification and susceptibility to

follow.  The final urine culture and sensitivity results showed a colony count greater

than equal to 100,000 CFU/mL, history of E coli, this is a multidrug resistant organism

resistant to Bactrim, ampicillin, ampicillin/sulbactam or Unasyn, Ancef, Zosyn;

sensitivity to cefepime less than equal to 4, sensitivity to cefuroxime less than equal

to 4, sensitivity to levofloxacin less than equal to 2.  The patient is allergic to

levofloxacin and penicillin.  On 2020, chest x-ray had shown; according to the

interpreting radiologist, hazy opacification of bilateral lung bases, right more than

left, which may represent aspiration and/or developing multifocal pneumonia in the

proper clinical setting.  A followup CT of the chest following day on 2020, is

severely limited by respiratory motion artifact.  However, within the confines of this

limitation, there are no focal consolidation, pleural effusion, or pneumothorax.  IV

fluids, normal saline at 100 mL an hour were continued.  During his stay, his creatinine

improved from 1.45 to 1.3 to 1.14 to 1.08 to 1.03.  Sodium remained low during his stay,

but was very low on admission at 123, but then improved to 129, 131, 132, and today is

135.  Serum bicarbonate improved from 17 to 18 to 19 to 22, and then today is 23.

Hemoglobin A1c was 7.3%.  Blood sugars were as high as 275 to 83, yesterday blood sugars

107, 215, 179, 114, and was 97 this morning.  Thyroid-stimulating hormone was 6.377 on

2020.  The levothyroxine dose was increased from 125 to 137 mcg daily.

Subjectively, the patient is very much improved.  Physical exam was within normal

limits.  The patient is in good spirits, ready go home.  Vital signs from today,

temperature 97.7, pulse is 51, blood pressure 133/68, respirations 18, and oxygen

saturation 100%.  Sodium 135, potassium 4, chloride 100, CO2 of 23, anion gap 16, BUN

12, creatinine 1.03, estimated GFR greater than 60, calcium 9.4.  WBC 7.2, hemoglobin

11.6, hematocrit 36, platelets 508, neutrophils 69.6%.  The patient will be discharged

home on [employer] diet.  Activity level as tolerated.  Follow up

with PCP, Dr. Masood Meza in 1 to 2 weeks.  Follow up with Dr. Burns and Dr. Araiza

as 

directed.  Discharge prescriptions include p.r.n. Tylenol and cefuroxime 500 mg p.o.

q.12 hours x7 days. 

 

 

Dictated by Dung Scott NP

 

______________________________

Saul Villar MD

 

HWP/MODL

D:  2020 08:30:39

T:  2020 10:03:09

Job #:  [id number]

## 2020-08-31 NOTE — NUR
Left AC IV discontinued. No signs of infiltration noted. 2x2 gauze and coban placed. Taken 
via wheelchair to personal car. AAOX4 to time, person, place, situation. Respiration even 
and unlabored. Denies pain. Discharge instructions rx, and all personal belongings taken 
with patient.

## 2020-09-28 NOTE — XMS REPORT
Clinical Summary

                             Created on: 2020



Ziyad Whitt

External Reference #: [id number]

: 1947

Sex: Male



Demographics





                          Address                   [address] [place]  [address]

 

                          Home Phone                [phone]

 

                          Preferred Language        English

 

                          Marital Status            

 

                          Congregational Affiliation     Unknown

 

                          Race                      White

 

                          Ethnic Group              /Latin





Author





                          Author                    [facility]

 

                          Organization              [facility]

 

                          Address                   Unknown

 

                          Phone                     Unavailable







Support





                Name            Relationship    Address         Phone

 

                Aditi PAREDES            Unknown         [phone]







Care Team Providers





                    Care Team Member Name Role                Phone

 

                    Masood Meza MD PCP                 [phone]

0







Allergies





                                        Comments



                 Active Allergy  Reactions       Severity        Noted Date 

 

                                         



                     Levofloxacin        GI                  2019 



                                         Intolerance   

 

                                         



                     Penicillins         Swelling            2019 







Medications





                          End Date                  Status



              Medication   Sig          Dispensed    Refills      Start  



                                         Date  

 

                                                    Active



                     fenofibrate (TRICOR) 145  Take 145 mg         0   



                           MG tablet                 by mouth     



                                         nightly.     

 

                                                    Active



                     lisinopril          Take 5 mg by        0   



                           (PRINIVIL,ZESTRIL) 5 mg   mouth every     



                           tablet                    evening.     

 

                                                    Active



                     glimepiride (AMARYL) 4 MG  Take 4 mg by        0   



                           tablet                    mouth 2 (two)     



                                         times a day.     

 

                                                    Active



                     TESTOSTERONE CYPIONATE IM  Inject into         0   



                                         the shoulder,     



                                         thigh, or     



                                         buttocks     



                                         every 14     



                                         (fourteen)     



                                         days.     

 

                                                    Active



                     sildenafil citrate  Take by             0   



                           (VIAGRA ORAL)             mouth.     

 

                                                    Active



                     solifenacin succinate  Take by             0   



                           (VESICARE ORAL)           mouth.     

 

                                                    Active



                     lansoprazole (PREVACID)  Take 15 mg by       0   



                           15 MG capsule             mouth daily.     

 

                                                    Active



                     gabapentin (NEURONTIN)  Take 600 mg         0   



                           600 mg tablet             by mouth 3     



                                         (three) times     



                                         a day.     

 

                                                    Active



                     metFORMIN (GLUCOPHAGE)  Take 500 mg         0   



                           500 mg tablet             by mouth     



                                         daily with     



                                         breakfast.     

 

                                                    Active



                     levothyroxine (SYNTHROID,  Take 125 mcg        0   



                           LEVOXYL) 125 mcg tablet   by mouth     



                                         daily.     

 

                                                    Active



                     dutasteride (AVODART) 0.5  Take 0.5 mg         0   



                           mg capsule                by mouth     



                                         daily.     







Active Problems





 



                           Problem                   Noted Date

 

 



                           Calculus of kidney        2019







Family History





   



                 Medical History  Relation        Name            Comments

 

   



                           Diabetes                  Mother  







   



                 Relation        Name            Status          Comments

 

   



                           Father                     

 

   



                           Mother                     







Social History





                                        Date



                 Tobacco Use     Types           Packs/Day       Years Used 

 

                                         



                 Former Smoker   Cigarettes      0.2             30 

 

    



                                         Smokeless Tobacco: Never   



                                         Used   







                    Drinks/Week         oz/Week             Comments



                                         Alcohol Use   

 

                                                            ex social smoker



                                         Not Currently   







 



                           Sex Assigned at Birth     Date Recorded

 

 



                                         Not on file 







Last Filed Vital Signs

Not on file



Plan of Treatment





   



                 Health Maintenance  Due Date        Last Done       Comments

 

   



                           COLONOSCOPY SCREENING     11/10/1997  

 

   



                           SHINGLES VACCINES (#1)    11/10/1997  

 

   



                     65+ PNEUMOCOCCAL VACCINE  11/10/2012          2007 



                                         (1 of 1 - PPSV23)   

 

   



                           INFLUENZA VACCINE         2020  







Results

Not on fileafter 2019



Insurance





                                        Type



            Payer      Benefit    Subscriber ID  Effective  Phone      Address 



                           Plan /                    Dates   



                                         Group     

 

                                        PPO



                 [employer] MEDICARE  [employer]          [id number]       2018-P   



                           MEDICARE                  resent   



                                         PPO/PFFS/E     



                                         [facility]     







     



            Guarantor Name  Account    Relation to  Date of    Phone      Deonin

g Address



                     Type                Patient             Birth  

 

     



            Jose EduardoNavarroo DAWN  Personal/F  Self       1947  [phone]  [address]



                     [facility]               ([place])              [place] [address]







Advance Directives





For more information, please contact: [phone]







                          Patient Representative    Explanation



                           Type                      Date Recorded  

 

                                                     



                           Advance Directives,       2019 11:23 AM  



                                         Living Will and   



                                         Medical Power of

## 2020-09-28 NOTE — EMERGENCY DEPARTMENT NOTE
History of Present Illnes


History of Present Illness


Chief Complaint:  Genitourinary


History of Present Illness


This is a 72 year old  male hx of prostatitis c/o bladder pain, diffi

culty urination for several weeks.  He has been hospitalized before in August 

for bladder infection  .


Historian:  Patient, Family Member


Arrival Mode:  Car


Onset (how long ago):  week(s)


Radiation:  Reports back


Severity:  moderate


Onset quality:  gradual


Duration (how long):  week(s)


Progression:  worsening


Context:  Reports recent illness


Relieving factors:  none


Exacerbating factors:  none


Associated symptoms:  Reports diaphoresis, Reports fever/chills, Reports loss of

appetite


Treatments prior to arrival:  none


Previous service:  medications given, tests performed, re-evaluation





Past Medical/Family History


Physician Review


I have reviewed the patient's past medical and family history.  Any updates have

been documented here.





Past Medical History


Past Medical History:  None, Hypertension, Diabetes, Hypothyroidism, UTI's, 

Hyperlipedemia


Other Medical History:  


NEUROPATHY


Past Surgical History:  None


Other Surgery:  


rotater cuff, hernia repair, thyroid removed





Social History


Smoking Cessation:  Unknown if ever smoked


Alcohol Use:  Social


Any Illegal Drug Use:  No


TB Exposure/Symptoms:  No


Physically hurt or threatened:  No





Family History


Family history of heart diseas:  No





Other


Last Tetanus:  unk


Any Pre-Existing Lines (PICC,:  No


Is patient up to date on immun:  No





Review of Systems


Review of Systems


Constitutional:  Reports as per HPI, Reports chills, Reports fever


EENTM:  Reports no symptoms


Cardiovascular:  Reports no symptoms


Respiratory:  Reports no symptoms


Gastrointestinal:  Reports no symptoms


Genitourinary:  Reports as per HPI, Reports dysuria, Reports frequency, Reports 

pain


Musculoskeletal:  Reports no symptoms


Integumentary:  Reports no symptoms


Neurological:  Reports no symptoms


Psychological:  Reports no symptoms


Endocrine:  Reports no symptoms


Hematological/Lymphatic:  Reports no symptoms





Physical Exam


Related Data


Allergies:  


Coded Allergies:  


     Penicillins (Verified  Allergy, Mild, 10/4/12)


     levofloxacin (Verified  Allergy, Mild, ITCHING, 2/28/12)


Uncoded Allergies:  


     IVP DYE (Allergy, Mild, 10/4/12)


Vital signs reviewed:  Yes





Physical Exam


CONSTITUTIONAL





Constitutional:  Present well-developed, Present well-nourished


HENT


HENT:  Present normocephalic, Present atraumatic, Present oropharynx 

clear/moist, Present nose normal


HENT L/R:  Present left ext ear normal, Present right ext ear normal


EYES





Eyes:  Reports PERRL, Reports conjunctivae normal


NECK


Neck:  Present ROM normal


PULMONARY


Pulmonary:  Present effort normal, Present breath sounds normal


CARDIOVASCULAR





Cardiovascular:  Present regular rhythm, Present heart sounds normal, Present 

capillary refill normal, Present normal rate


GASTROINTESTINAL





Abdominal:  Present soft, Present nontender, Present bowel sounds normal


GENITOURINARY





Genitourinary:  Present exam deferred


SKIN


Skin:  Present warm, Present dry


MUSCULOSKELETAL





Musculoskeletal:  Present ROM normal


NEUROLOGICAL





Neurological:  Present alert, Present oriented x 3, Present no gross motor or 

sensory deficits


PSYCHOLOGICAL


Psychological:  Present mood/affect normal, Present judgement normal





Results


Laboratory


Lab results reviewed:  Yes


Laboratory comments


Leukocyte esterace positive





Assessment & Plan


Medical Decision Making


MDM


prostatitis, UTI





Reassessment


Reassessment


He received Cefepime and d/c on cefuroxime last discharge in August. Urine 

cultures grew the same E.coli twice, sensitive to Cefuroxime





Assessment & Plan


Final Impression:  


(1) Acute on chronic prostatitis


(2) UTI (urinary tract infection)


Depart Disposition:  HOME, SELF-CARE


Home Meds


Active Scripts


Bacillus Coagulans (Bacid with Lactospore) 1 Each Capsule, 1 CAP PO BID, #30


   Prov:ANA ROSA FERNÁNDEZ MD         9/28/20


Ondansetron Hcl* (ZOFRAN*) 4 Mg Tablet, 4 MG SL Q6H PRN for NAUSEA, #14 MG 0 

Refills


   Prov:ANA ROSA FERNÁNDEZ MD         9/28/20


Acetaminophen/Codeine* (TYLENOL # 3*) 1 Ea Tab, 1 TAB PO Q4HR PRN for pain or 

cough, #30


   Prov:ANA ROSA FERNÁNDEZ MD         9/28/20


Cefdinir (OMNICEF) 300 Mg Capsule, 300 MG PO BID for 10 Days, CAP


   Prov:ANA ROSA FERNÁNDEZ MD         9/28/20


Cefuroxime Axetil (CEFUROXIME) 250 Mg Tablet, 500 MG PO Q12H for 7 Days, #14 TAB

0 Refills


   Prov:VAL KENNEDY NP         8/31/20


Acetaminophen (ACETAMINOPHEN) 325 Mg Tablet, 650 MG PO Q4H PRN for Mild Pain (1-

3) or Fever>100.8 for 14 Days, #30 TAB 0 Refills


   Prov:VAL KENNEDY NP         8/31/20


Reported Medications


Metformin Hcl (METFORMIN HCL) 500 Mg Tablet, 500 MG PO BID, #60 TAB


   7/27/15


Tamsulosin Hcl (TAMSULOSIN HCL) 0.4 Mg Cap.er.24h, 0.4 MG PO DAILY


   7/27/15


Solifenacin Succinate (VESICARE) 5 Mg Tablet, 5 MG PO DAILY, #30 TAB


   7/27/15


Fenofibrate Nanocrystallized (FENOFIBRATE) 145 Mg Tablet, 145 MG PO DAILY


   7/27/15


[Synthroid]   No Conflict Check, 125 MCG PO DAILY


   8/13/12


Lansoprazole (PREVACID SOLUTAB) 30 Mg Tabdp, 30 MG PO DAILY


   8/13/12


[Avodart]   No Conflict Check, 5 MG PO DAILY


   8/13/12


[Amarayl]   No Conflict Check, 4 MG PO BID


   8/13/12


[Lisinopril]   No Conflict Check, 5 MG PO DAILY


   8/13/12


[Neurontin]   No Conflict Check, 600 MG PO DAILY


   8/13/12





Physician Attestation


Provider Attestation


Based on last discharge summary and urine cultures, I would d/c him on Cefdinir 

and f/u. case discussed with ANA ROSA Monique MD                Sep 28, 2020 15:20

## 2020-09-28 NOTE — XMS REPORT
Clinical Summary

                             Created on: 2020



Ziyad Whitt

External Reference #: [id number]

: 1947

Sex: Male



Demographics





                          Address                   [address] [id number]

 

                          Home Phone                [phone]

 

                          Preferred Language        Unknown

 

                          Marital Status            Unknown

 

                          Religion Affiliation     Unknown

 

                          Race                      Unknown

 

                          Ethnic Group              Unknown





Author





                          Author                    [facility]

 

                          Address                   Unknown

 

                          Phone                     Unavailable







Care Team Providers





                    Care Team Member Name Role                Phone

 

                          PCP                       Unavailable







Allergies

Not on File



Medications

Not on file



Active Problems





Not on file



Social History





                                        Date



                 Tobacco Use     Types           Packs/Day       Years Used 

 

                                         



                                         Never Assessed    







 



                           Sex Assigned at Birth     Date Recorded

 

 



                                         Not on file 







                                        Industry



                           Job Start Date            Occupation 

 

                                        Not on file



                           Not on file               Not on file 







                                        Travel End



                           Travel History            Travel Start 

 





                                         No recent travel history available.







Last Filed Vital Signs

Not on file



Plan of Treatment





Not on file



Results

Not on fileafter 2019

## 2020-09-28 NOTE — XMS REPORT
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 Weight 88.636, kg, Start date: 18 18:26:00 CDT, Stop date: 18
18:26:00 CDT                                                [facility]

 

       Sodium Chloride 0.9% IV 1,000 mL        2018 23:26:00        No    

                             1,000 mL, 

Rate: 75 ml/hr, Infuse over: 13.3 hr, Route: IV, Dosing Weight 88.636 kg, Total 
Volume: 1,000, Start date: 18 18:26:00 CDT, Duration: 30 day, Stop date: 
18 18:25:00 CDT, 2.04, m2                                         [facility]

 

     Amarayl Amarayl           Yes            4         Twice A Day           [facility]

 

     Avodart Avodart           Yes            5         Daily           [facility]

 

                          Fenofibrate Nanocrystallized (Fenofibrate) 145 Mg TABL

ET Fenofibrate 

Nanocrystallized (Fenofibrate) 145 Mg TABLET             Yes               145  

       Daily             [facility]

 

                          Lansoprazole (Prevacid Solutab) 30 Mg TABDP Lansoprazo

le (Prevacid Solutab) 30 

Mg TABDP             Yes               30          Daily             [facility]

 

     Lisinopril Lisinopril           Yes            5         Daily           [facility]

[facility]

 

     Metformin Hcl Metformin Hcl           Yes            500       Twice A Day 

          [facility]

 

     Neurontin Neurontin           Yes            600       Daily           [facility]

 

                          Solifenacin Succinate (Vesicare) 5 Mg TABLET Solifenac

in Succinate (Vesicare) 5 

Mg TABLET             Yes               5           Daily             [facility]

 

     Synthroid Synthroid           Yes            125       Daily           [facility]

 

     Tamsulosin Hcl Tamsulosin Hcl           Yes            .4        Daily     

      [facility]

 

     Minocycline Hcl Minocycline Hcl      2020 00:00:00 No             100

       Daily           [facility]

 

     Levitra Levitra      2015 00:00:00 No                       Daily    

       [facility]

 

     Metformin Metformin      2015 00:00:00 No                       Daily

           [facility]

 

     Tricor Tricor      2015 00:00:00 No                       Daily      

     [facility]







Vital Signs





             Vital Name   Observation Time Observation Value Comments     Source

 

             Body Temperature 2020 08:41:00 98.2 [degF]               [facility]

 

             Weight       2020 23:49:00 175 [lb_av]               [facility]

 

             BMI (Body Mass Index) 2020 23:49:00 29.1 kg/m2               

 [facility]

 

             Systolic (mm Hg) 2020 19:21:00                           [facility]

 

             Diastolic (mm Hg) 2020 19:21:00                           [facility]

 

             Heart Rate   2020 19:21:00                           [facility]

 [place]

 

             Temperature Oral (F) 2020 19:21:00 98.1 F                    

[facility]

 

             Height       2020 19:21:00 165.1 cm                  [facility]

 

             Weight       2020 19:21:00                           [facility]

 

             BMI Calculated 2020 19:21:00                           [facility]

 

             Systolic (mm Hg) 2019 17:05:00                           [facility]

[facility]

 

             Diastolic (mm Hg) 2019 17:05:00                           [facility]

 

             Heart Rate   2019 17:05:00                           [facility]

 

             Temperature Oral (F) 2019 17:05:00 98.0 F                    

[facility] [place]

 

             Height       2019 17:05:00 165.1 cm                  [facility]

 [place]

 

             Weight       2019 17:05:00                           [facility]

 [place]

 

             BMI Calculated 2019 17:05:00                           [facility]

[facility] [place]

 

             Weight       2018-03-15 14:00:00                           [facility]

 [facility]

 

             Systolic (mm Hg) 2018-03-15 13:00:00                           [facility]

[facility] [place]

 

             Diastolic (mm Hg) 2018-03-15 13:00:00                           [facility]

 

             Temperature Oral (F) 2018-03-15 13:00:00 98.2 F                    

[facility]

 

             Heart Rate   2018-03-15 13:00:00                           [facility]

 

             Respitory Rate 2018-03-15 13:00:00                           [facility]

[facility]

 

             Heart Rate   2018-03-15 08:50:00                           [facility]

 

             Respitory Rate 2018-03-15 08:50:00                           [facility]

[facility]

 

             Systolic (mm Hg) 2018-03-15 08:50:00                           [facility]

[facility] [place]

 

             Diastolic (mm Hg) 2018-03-15 08:50:00                           [facility]

[facility]

 

             Temperature Oral (F) 2018-03-15 08:50:00 98.0 F                    

[facility]

 

             Temperature Oral (F) 2018-03-15 03:47:00 97.6 F                    

[facility] [place]

 

             Respitory Rate 2018-03-15 03:47:00                           [facility]

[facility] [place]

 

             Heart Rate   2018-03-15 03:47:00                           [facility]

 

             Systolic (mm Hg) 2018-03-15 03:47:00                           [facility]

[facility]

 

             Diastolic (mm Hg) 2018-03-15 03:47:00                           [facility]

[facility] [place]

 

             BMI Calculated 2018-03-15 03:40:00                           [facility]

[facility] [place]

 

             Weight       2018-03-15 03:40:00                           [facility]

 [facility]

 

             Height       2018-03-15 03:40:00 165.1 cm                  [facility]

 [place]

 

             Weight       2018 19:35:00                           [facility]

 [place]

 

             BMI Calculated 2018 19:35:00                           [facility]

[facility] [place]

 

             Height       2018 19:35:00 165.1 cm                  [facility]

 [facility]







Procedures





                Procedure       Date / Time Performed Performing Clinician [facility]

e

 

                Computed tomography of chest without contrast 2020 00:00:0

0                 [facility]

 

                CT of abdomen and pelvis without contrast 2020 00:00:00   

              [facility]

 

                Ultrasound, renal 2020 00:00:00                 [facility]







Plan of Care





             Planned Activity Planned Date Details      Comments     Source

 

                    Future Scheduled Test 2020 00:00:00 INFLUENZA VACCINE 

[code = INFLUENZA 

VACCINE]                                            [facility] Scheduled Test 2012-11-10 00:00:00 65+ PNEUMOCOCCAL V

ACCINE (1 of 1 - 

PPSV23) [code = 65+ PNEUMOCOCCAL VACCINE (1 of 1 - PPSV23)]                     

      [facility] Scheduled Test 1997-11-10 00:00:00 COLONOSCOPY SCREEN

ING [code = 

COLONOSCOPY SCREENING]                              [facility] Scheduled Test 1997-11-10 00:00:00 SHINGLES VACCINES 

(#1) [code = 

SHINGLES VACCINES (#1)]                             [facility]

 

             Instructions              Diabetes and Diet              [facility]

 

             Instructions              Infection Control              [facility]

 

             Instructions              Pneumonia - Bacterial              [facility]

 

             Instructions              Urinary Tract Infection - Men            

  [facility]







Encounters





             Start Date/Time End Date/Time Encounter Type Admission Type Attendi

[facility]   Care Department Encounter ID    Source

 

        2020 14:33:56         Outpatient                 [facility]    [id number]

[id number]    [facility]

 

           2020 21:50:00 2020 10:30:00 Discharged Inpatient 1       

   LANI OLIVA 

[facility] [id number]        [facility]

 

             2020 14:56:00 2020 18:48:00 Departed Emergency Room 1  

          Mary Sorianoew         [facility] [id number]    [facility]

[facility]

 

           2020 07:49:00 2020 07:49:00 Registered Clinic 3          

GIOVANNI WHITE 

[facility] [id number]        [facility]

 

           2020 08:30:00 2020 08:30:00 Outpatient            [facility]

                    [facility]                [id number]         

 

           2020 13:00:00 2020 23:59:59 Outpatient            [facility]

                    [facility]                [id number]         

 

           2020 05:58:00 2020 23:59:00 Outpatient            [facility] Chandra [facility]

                    [facility]                [id number]         

 

        2020-01-10 14:07:48 2020 23:59:59 Outpatient                 [facility] 

   [facility]    [id number]  

 

           2019 09:45:00 2019 23:59:59 Outpatient            [facility]

                    [facility]                [id number]         

 

        2018 10:04:00 2018 23:59:59 Outpatient                 [facility]

TERI MHMISCHER 

[id number]                             

 

           2018 11:02:00 2018 23:59:00 Outpatient            Myles Pelletier [facility]

                    [facility]              [id number]         

 

           2018 12:00:00 2018 12:00:00 Outpatient            VIS[facility], 

[facility] 

[facility]           [facility]           [id number]         

 

          2018 14:34:00 2018-03-15 10:25:00 Outpatient           Myles Murphy [facility]                     [id number]               

 

             2018 09:31:00 2018 13:37:00 Departed Emergency Room ER 

          LOVELY DEY           [facility]          [id number]    [facility]

 

           2018 09:17:00 2018 09:17:00 Registered Clinic GIOVANNI WANG 

[facility]              [id number]        [facility]

 

           2017 08:29:00 2017 08:29:00 Registered Clinic GIOVANNI WANG 

[facility]              [id number]        [facility]

 

           2017 08:14:00 2017 08:14:00 Registered Clinic GIOVANNI WANG 

[facility]              [id number]        [facility]

 

        2017 07:54:00 2017 07:54:00 Registered Clinic               

  [facility]  

[id number]                            [facility]







Results





           Test Description Test Time  Test Comments Results    Result Comments 

Source

 

                          Capillary blood glucose measurement by glucometer (mas

s/volume) 2020 

07:16:00                                  

 

                                        Test Item

 

             Bedside Glucose (test code = 54615-8) 97                     

           





Meter ID: [id number] HospitalBlood leukocytes 
automated count (number/volume)2020 05:51:00* 



             Test Item    Value        Reference Range Interpretation Comments

 

             White Blood Count (test code = 6690-2) 7.20         4.8-10.8       

            





[facility] count 
(number/volume)2020 05:51:00* 



             Test Item    Value        Reference Range Interpretation Comments

 

             Red Blood Count (test code = 789-8) 4.65         4.3-5.7           

         





[facility] 
(moles/volume)2020 05:51:00* 



             Test Item    Value        Reference Range Interpretation Comments

 

             Hemoglobin (test code = 76179-5) 11.6         14.0-18.0            

      





[facility] HospitalAutomated blood hematocrit (volume 
fraction)2020 05:51:00* 



             Test Item    Value        Reference Range Interpretation Comments

 

             Hematocrit (test code = 4544-3) 36.0         38.2-49.6             

     





[facility] HospitalAutomated erythrocyte mean corpuscular 
[id number] 05:51:00* 



             Test Item    Value        Reference Range Interpretation Comments

 

             Mean Corpuscular Volume (test code = 787-2) 77.4         81-99     

                 





[facility] Hospital[facility]ated erythrocyte mean corpuscular 
hemoglobin (mass per erythrocyte)2020 05:51:00* 



             Test Item    Value        Reference Range Interpretation Comments

 

             Mean Corpuscular Hemoglobin (test code = 785-6) 24.9         28-32 

                     





[facility] HospitalAutomated erythrocyte mean corpuscular 
hemoglobin concentration measurement (mass/volume)2020 05:51:00* 



             Test Item    Value        Reference Range Interpretation Comments

 

             Mean Corpuscular Hemoglobin Concent (test code = 786-4) 32.2       

  31-35                      





[facility] HospitalRDW [id number]-[id number] 05:51:00* 



             Test Item    Value        Reference Range Interpretation Comments

 

             Red Cell Distribution Width (test code = 04196-9) 15.2         11.7

-14.4                  





[facility] Hospital[facility] blood platelet count 
(count/volume)2020 05:51:00* 



             Test Item    Value        Reference Range Interpretation Comments

 

             Platelet Count (test code = 777-3) 508          140-360            

        





[facility] HospitalAutAt[facility] Hospitaled blood segmented neutrophil 
count as percentage of total qhntlpuvwl5062-57-53 05:51:00* 



             Test Item    Value        Reference Range Interpretation Comments

 

             Neutrophils (%) (Auto) (test code = 22291-3) 69.6         38.7-80.0

                  





[facility] HospitalAutomated blood lymphocyte count as 
percentage ot total [id number] 05:51:00* 



             Test Item    Value        Reference Range Interpretation Comments

 

             Lymphocytes (%) (Auto) (test code = 736-9) 16.5         18.0-39.1  

                





[facility] HospitalAutomated blood monocyte count as 
percentage of total fjjgsxepvc3235-03-77 05:51:00* 



             Test Item    Value        Reference Range Interpretation Comments

 

             Monocytes (%) (Auto) (test code = 5905-5) 8.2          4.4-11.3    

               





[facility] HospitalAutomated blood eosinophil count as 
percentage of total fqjzhbbtnw1781-05-32 05:51:00* 



             Test Item    Value        Reference Range Interpretation Comments

 

             Eosinophils (%) (Auto) (test code = 713-8) 1.8          0.0-6.0    

                





[facility] HospitalAutomated blood basophil count as 
percentage of total ratgpzsiyo2841-47-97 05:51:00* 



             Test Item    Value        Reference Range Interpretation Comments

 

             Basophils (%) (Auto) (test code = 706-2) 0.4          0.0-1.0      

              





[facility] HospitalFluoroscopic procedure less than one hour
[id number] 05:51:00* 



             Test Item    Value        Reference Range Interpretation Comments

 

             IM GRANULOCYTES % (test code = IM GRANULOCYTES %) 3.5          0.0-

1.0                    





[facility] HospitalAutomated blood neutrophil count
2020 05:51:00* 



             Test Item    Value        Reference Range Interpretation Comments

 

             Neutrophils # (Auto) (test code = 751-8) 5.0          2.1-6.9      

              





[facility] Hospital[facility] lymphocytes count (number/volume)
2020 05:51:00* 



             Test Item    Value        Reference Range Interpretation Comments

 

             Lymphocytes # (Auto) (test code = 88279-1) 1.2          1.0-3.2    

                





[facility]Bl[facility] monocytes automated count 
(number/volume)2020 05:51:00* 



             Test Item    Value        Reference Range Interpretation Comments

 

             Monocytes # (Auto) (test code = 742-7) 0.6          0.2-0.8        

            





[facility] HospitalAutomated blood eosinophil count
2020 05:51:00* 



             Test Item    Value        Reference Range Interpretation Comments

 

             Eosinophils # (Auto) (test code = 711-2) 0.1          0.0-0.4      

              





[facility] HospitalAutomated blood basophil count 
(count/volume)2020 05:51:00* 



             Test Item    Value        Reference Range Interpretation Comments

 

             Basophils # (Auto) (test code = 704-7) 0.0          0.0-0.1        

            





[facility] HospitalFluoroscopic procedure less than one hour
mpbjmsjj1846-15-98 05:51:00* 



             Test Item    Value        Reference Range Interpretation Comments

 

                                        Absolute Immature Granulocyte (auto (aaliyah

t code = Absolute Immature Granulocyte 

(auto)          0.25            0-0.1                            





[facility] sodium [facility] 
(moles/volume)2020 05:51:00* 



             Test Item    Value        Reference Range Interpretation Comments

 

             Sodium Level (test code = 2951-2) 135          136-145             

       





[facility] potassium measurement 
(moles/volume)2020 05:51:00* 



             Test Item    Value        Reference Range Interpretation Comments

 

             Potassium Level (test code = 2823-3) 4.0          3.5-5.1          

          





[facility] chloride measurement 
(moles/volume)2020 05:51:00* 



             Test Item    Value        Reference Range Interpretation Comments

 

             Chloride Level (test code = 2075-0) 100                      

         





[facility] carbon dioxide, total 
measurement (moles/volume)2020 05:51:00* 



             Test Item    Value        Reference Range Interpretation Comments

 

             Carbon Dioxide Level (test code = 2028-9) 23           22-29       

               





[facility] or plasma anion [id number] 
05:51:00* 



             Test Item    Value        Reference Range Interpretation Comments

 

             Anion Gap (test code = 33037-3) 16.0         8-16                  

     





[facility] urea nitrogen measurement
(mass/volume)2020 05:51:00* 



             Test Item    Value        Reference Range Interpretation Comments

 

             Blood Urea Nitrogen (test code = 3094-0) 12           7-26         

              





[facility] creatinine measurement 
(mass/volume)2020 05:51:00* 



             Test Item    Value        Reference Range Interpretation Comments

 

             Creatinine (test code = 2160-0) 1.03         0.72-1.25             

     





[facility] urea nitrogen/creatinine 
mass yjrbm6122-41-66 05:51:00* 



             Test Item    Value        Reference Range Interpretation Comments

 

             BUN/Creatinine Ratio (test code = 3097-3) 12           6-25        

               





[facility] HospitalEstimated glomerular filtration rate 
(GFR) akegknygytklz9703-84-43 05:51:00* 



             Test Item    Value        Reference Range Interpretation Comments

 

             Estimat Glomerular Filtration Rate (test code = 096247984) > 60    

     >60                        





Ranges were taken from the National Kidney Disease Education Program and the [facility] literature.Reference ranges:60 or greater: Gkespy67-97 (
for 3 consecutive months): Chronic kidney disease 15 or less: Kidney failureSeymour HospitalGlucose zhbiovkuzqp6354-78-37 05:51:00* 



             Test Item    Value        Reference Range Interpretation Comments

 

             Glucose Level (test code = SWY5802) 82                       

         





[facility] calcium measurement 
(mass/volume)2020 05:51:00* 



             Test Item    Value        Reference Range Interpretation Comments

 

             Calcium Level (test code = 18440-6) 9.4          8.4-10.2          

         





[facility] total bilirubin 
measurement (mass/volume)2020 09:10:00* 



             Test Item    Value        Reference Range Interpretation Comments

 

             Total Bilirubin (test code = 1975-2) 0.4          0.2-1.2          

          





[facility] HospitalFluoroscopic procedure less than one hour
iajwoxud6589-13-91 09:10:00* 



             Test Item    Value        Reference Range Interpretation Comments

 

                                        Aspartate Amino Transf (AST/SGOT) (test 

code = Aspartate Amino Transf 

(AST/SGOT))     47              5-34                             





[facility]ererum or plasma alanine aminotransferase 
measurement (enzymatic activity/volume)2020 09:10:00* 



             Test Item    Value        Reference Range Interpretation Comments

 

             Alanine Aminotransferase (ALT/SGPT) (test code = 1742-6) 56        

   0-55                       





[facility] protein measurement 
(mass/volume)2020 09:10:00* 



             Test Item    Value        Reference Range Interpretation Comments

 

             Total Protein (test code = 2885-2) 6.5          6.5-8.1            

        





[facility] albumin measurement 
(mass/volume)2020 09:10:00* 



             Test Item    Value        Reference Range Interpretation Comments

 

             Albumin (test code = 1751-7) 2.1          3.5-5.0                  

  





[facility] HospitalPlasma globulin measurement (mass/volume)
2020 09:10:00* 



             Test Item    Value        Reference Range Interpretation Comments

 

             Globulin (test code = 21647-1) 4.4          2.3-3.5                

    





[facility] albumin/globulin mass 
[id number] 09:10:00* 



             Test Item    Value        Reference Range Interpretation Comments

 

             Albumin/Globulin Ratio (test code = 1759-0) 0.5          0.8-2.0   

                 





[facility] 
[facility] (enzymatic activity/volume)2020 09:10:00* 



             Test Item    Value        Reference Range Interpretation Comments

 

             Alkaline Phosphatase (test code = 6768-6) 67                 

               





[facility]  18[facility]56[facility]00           
                                                                          [facility]                        [address] [address]      Patient Name: SELVIN PATEL            
                   MR #: [id number]                  : 1947            
                      Age/Sex: 72/M  Acct #: [id number]                       
      Req #: [id number]  Adm Physician: LANI OLIVA MD                        
            Ordered by: REILLY MACHADO MD                         Report #: [id number]
[id number]        Location: MED/SURG2                               Room/Bed: [facility]   
         ______________________________________________________________
_____________________________________    Procedure: [id number] CT/CT CHEST WO  Ex
am Date: 20                            Exam Time: 1749                    
                         REPORT STATUS: Signed    EXAM: CT Chest WITHOUT contra
st   INDICATION: Evaluate for pneumonia   COMPARISON: Chest x-ray on 2020. 
    TECHNIQUE:   Chest was scanned utilizing a multidetector helical scanner fr
om the lung apex   through the level of the adrenal glands without administratio
n of IV contrast.   Absence of intravenous contrast decreases sensitivity for de
tection of   lymphadenopathy and vascular pathology. Coronal and sagittal reform
ations were   obtained. Routine protocol was performed.        IV CONTRAST: None
     COMPLICATIONS: None         RADIATION DOSE:         Total DLP: 465 mGy*cm  
     Estimated effective dose: (DLP x 0.014 x size factor) mSv        CTDIvol 
has been reviewed. It is below the limits set by the Radiation   Protocol Commit
frankie (RPC).        Dose modulation, iterative reconstruction, and/or weight based
adjustment   of the mA/kV was utilized to reduce the radiation dose to as low as
reasonably   achievable.                  FINDINGS:      LINES/ TUBES: None.    
 LUNGS, AIRWAYS and PLEURA: Evaluation of the lungs is severely limited by   
respiratory motion artifact, however, within the confines of this limitations   
there are no focal consolidation, pleural effusion or pneumothorax. There is   b
ibasilar atelectasis. No suspicious large mass identified. The central airways  
are patent and the trachea is midline.        HEART AND MEDIASTINUM: The thyroid
gland is normal. . No mediastinal, hilar or   axillary lymphadenopathy.  The h
eart is normal in size with atherosclerotic   calcification of the coronary vess
els.. There is no pericardial effusion.           UPPER ABDOMEN: Unremarkable.  
   BONES: There are degenerative changes in the thoracic spine. Suture anchor in
  the right humeral head.      SOFT TISSUES: Unremarkable.      IMPRESSION:   
Evaluation of the lungs is severely limited by respiratory motion artifact.   Ho
wever, within the confines of this limitations there are no focal   consolidatio
n, pleural effusion or pneumothorax.      Signed by: Kandice Spring MD on 20 7:02 PM        Dictated By: KANDICE SPRING MD  Electronically Signed By: JESE SPRING MD on 20  Transcribed By: VALDEMAR on 20       C
OPY TO:   REILLY MACHADO MD         Serum or plasma creatine kinase measurement 
(enzymatic activity/volume)2020 13:59:00* 



             Test Item    Value        Reference Range Interpretation Comments

 

             Creatine Kinase (test code = 2157-6) 38                      

          





[facility] creatine kinase MB 
measurement (mass/volume)2020 13:59:00* 



             Test Item    Value        Reference Range Interpretation Comments

 

             Creatine Kinase MB (test code = 78397-6) 0.40         0-5.0        

              





[facility] by highly 
sensitive enzyme bvoypwzncxe5122-58-70 13:59:00* 



             Test Item    Value        Reference Range Interpretation Comments

 

             Troponin I (test code = 09908-1) < 0.001      0-0.300              

      





[facility] less than one hour
kijsplor2353-74-03 05:20:00* 



             Test Item    Value        Reference Range Interpretation Comments

 

             Hemoglobin A1c Percent (test code = Hemoglobin A1c Percent) 7.3    

      4.0-7.0                    





[facility] HospitalPhosphorus lnxpndxulco3405-87-06 05:20:00
  * 



             Test Item    Value        Reference Range Interpretation Comments

 

             Phosphorus Level (test code = CQA7219) 3.2          2.3-4.7        

            





[facility] magnesium [facility] 
(mass/volume)2020 05:20:00* 



             Test Item    Value        Reference Range Interpretation Comments

 

             Magnesium Level (test code = 68441-6) 2.0          1.3-2.1         

           





[facility] triglyceride measurement 
(mass/volume)2020 05:20:00* 



             Test Item    Value        Reference Range Interpretation Comments

 

             Triglycerides Level (test code = 2571-8) 158          0-149        

              





[facility] cholesterol measurement 
(mass/volume)2020 05:20:00* 



             Test Item    Value        Reference Range Interpretation Comments

 

             Cholesterol Level (test code = 2093-3) 98           0-199          

            





Less than 200 mg/dL       Low Lnxb274 - 239 mg/dL           Borderline Qipt207 m
g/dl and greater     High Risk                        [facility] cholesterol in LDL measurement (mass/volume)
2020 05:20:00* 



             Test Item    Value        Reference Range Interpretation Comments

 

             LDL Cholesterol (test code = 2089-1) 45                      

          





[facility] cholesterol in HDL 
measurement (mass/volume)2020 05:20:00* 



             Test Item    Value        Reference Range Interpretation Comments

 

             HDL Cholesterol (test code = 2085-9) 21           40-60            

          





[facility] total 
cholesterol/cholesterol in HDL mass edcyj0174-92-10 05:20:00* 



             Test Item    Value        Reference Range Interpretation Comments

 

             Cholesterol/HDL Ratio (test code = 9830-1) 4.7          3.9-4.7    

                





[facility] thyrotropin measurement 
by detection limit <= 0.005 miu/l (units/volume)2020 05:20:00* 



             Test Item    Value        Reference Range Interpretation Comments

 

             Thyroid Stimulating Hormone (TSH) (test code = 71604-6) 6.377      

  0.350-4.940                





[facility] HospitalFluor[facility] procedure less than one hour
cmhridvg3645-71-38 20:45:00* 



             Test Item    Value        Reference Range Interpretation Comments

 

             Coronavirus (PCR) (test code = Coronavirus (PCR)) NOT DETECTED NOTD

ETECTED                





SARS-COV2/RT-PCR CEPHEIDResults are for the detection of SARS-COV-2 RNA. The RONIT
S-COV-2 RNA is generally detectable in nasopharyngeal swab specimens during the 
acute phase of infection. Positive results are indicitive of active infection wi
 SARS-COV-2; clinical correlation with patient history and other diagnostic in
formation is necessary to determine patient infection status. Positive results d
o not rule out bacterial infection or co-infection with other viruses. The agent
detected may not be the definite cause of the disease.The limit of detection for
this assay is 250 copies/mLThe SARS-CoV-2 test is a rapid, real-time RT-PCR test
intended for the qualitative detection of nucleic acid from SARS-CoV-2 in geno
opharyngeal swab specimen collected from individuals suspected of COVID-19 by 
eir healthcare provider. This test has not been Food and Drug Administration (FD
A) cleared or approved and has been authorized by FDA under an Emergency Use Aut
horization (EUA). This EUA will be effective until the declaration that circumst
ances exist justifying the authorization of the emergency use of in vitro diagno
stic test for detection and or diagnosis of COVID-19 is terminated under section
564(b) of the Act, or the the EUA is revoked under 564(g) of the ACT.[facility] HospitalUrine color htzzauygwqowt9868-08-07 20:30:00* 



             Test Item    Value        Reference Range Interpretation Comments

 

             Urine Color (test code = 5778-6) YELLOW       YELLOW               

      





[facility] HospitalUrine thldyxl9910-17-51 20:30:00* 



             Test Item    Value        Reference Range Interpretation Comments

 

             Urine Clarity (test code = 74115-2) SL CLOUDY    CLEAR             

         





[facility] Centerpecific [facility] Urine by Test strip
2020 20:30:00* 



             Test Item    Value        Reference Range Interpretation Comments

 

             Urine Specific Gravity (test code = 5811-5) 1.010        1.010-1.02

5                





[facility] HospitalUrine pH measurement by automated test 
pdahg7344-49-88 20:30:00* 



             Test Item    Value        Reference Range Interpretation Comments

 

             Urine pH (test code = 34109-4) 5            5-7                    

    





[facility] HospitalUrine leukocyte esterase detection by 
qykshvqy3516-19-17 20:30:00* 



             Test Item    Value        Reference Range Interpretation Comments

 

             Urine Leukocyte Esterase (test code = 5799-2) MODERATE     NEGATIVE

                   





[facility] HospitalUrine nitrite jedeuxepu5452-41-83 
20:30:00* 



             Test Item    Value        Reference Range Interpretation Comments

 

             Urine Nitrite (test code = 96256-5) NEGATIVE     NEGATIVE          

         





[facility] Hospital[facility]rine protein measurement by test strip 
(mass/volume)2020 20:30:00* 



             Test Item    Value        Reference Range Interpretation Comments

 

             Urine Protein (test code = 5804-0) TRACE        NEGATIVE           

        





[facility] HospitalUrine glucose ztkwhutvt2944-94-38 
20:30:00* 



             Test Item    Value        Reference Range Interpretation Comments

 

             Urine Glucose (UA) (test code = 2349-9) NEGATIVE     NEGATIVE      

             





[facility] HospitalUrine ketones detection by automated test
kwwyx6675-73-16 20:30:00* 



             Test Item    Value        Reference Range Interpretation Comments

 

             Urine Ketones (test code = 51725-9) NEGATIVE     NEGATIVE          

         





[facility] HospitalUrine urobilinogen measurement by test 
strip (mass/volume)2020 20:30:00* 



             Test Item    Value        Reference Range Interpretation Comments

 

             Urine Urobilinogen (test code = 83155-3) 0.2          0.2-1        

              





[facility] Hospital[facility]rine total bilirubin measurement 
(mass/volume)2020 20:30:00* 



             Test Item    Value        Reference Range Interpretation Comments

 

             Urine Bilirubin (test code = 1978-6) NEGATIVE     NEGATIVE         

          





[facility] HospitalUrine erythrocytes wwohkpcgi9890-11-71 
20:30:00* 



             Test Item    Value        Reference Range Interpretation Comments

 

             Urine Blood (test code = 42334-0) TRACE        NEGATIVE            

       





[facility] HospitalAutomated urine sediment leukocyte count 
by microscopy (number/high power field)2020 20:30:00* 



             Test Item    Value        Reference Range Interpretation Comments

 

             Urine WBC (test code = 5821-4) 21-50        0-5                    

    





[facility] HospitalErythrocytes detection in urine sediment 
by light qquyksgvvx0723-60-68 20:30:00* 



             Test Item    Value        Reference Range Interpretation Comments

 

             Urine RBC (test code = 15763-5) 0-5          0-5                   

     





[facility] HospitalBacteria detection in urine sediment by 
light malnyxbffw1733-05-28 20:30:00* 



             Test Item    Value        Reference Range Interpretation Comments

 

             Urine Bacteria (test code = 82438-5) MANY         NONE             

          





[facility] HospitalEpithelial cells detection in urine 
sediment by light kdatafkhwj2364-47-38 20:30:00* 



             Test Item    Value        Reference Range Interpretation Comments

 

             Urine Epithelial Cells (test code = 51314-1) NONE         NONE     

                  





[facility] HospitalBacterial urine vhwkloh3296-48-78 
20:30:00* 



             Test Item    Value        Reference Range Interpretation Comments

 

             Urine Culture (test code = 630-4) ESCHERICHIA COLI                 

           





[facility])2020 
20:12:00                                                                        
             [facility]                        [address] [place] [address]      Patient Name: SELVIN PATEL
                               MR #: [id number]                  : 1947
                                  Age/Sex: 72/M  Acct #: [id number]           
                  Req #: [id number]  Adm Physician:                             
                        Ordered by: JERARDO CORDOBA MD                    
    Report #: [id number]        Location: ER                                     
Room/Bed:                   
_____________________________________________________
______________________________________________    Procedure: [id number] DX/CHEST 
SINGLE (PORTABLE)  Exam Date: 20                            Exam Time: 194
5                                              REPORT STATUS: Signed    EXAMINAT
ION:  CHEST SINGLE (PORTABLE)          INDICATION: Fever      COMPARISON:  Chest
x-ray on 2020.           FINDINGS:          TUBES and LINES:  None.      L
UNGS:  Normal lung volumes. There is hazy opacification the bilateral lung   bas
es, right more to left.      PLEURA:  No pleural effusion or pneumothorax.      
HEART AND MEDIASTINUM:  The cardiomediastinal silhouette is unremarkable.       
  BONES AND SOFT TISSUES:  No acute osseous lesion.  Soft tissues are   unremar
kable.      UPPER ABDOMEN: No free air under the diaphragm.          IMPRESSION:
      Hazy opacification of bilateral lung bases, right more to left which may  
represent aspiration and/or developing multifocal pneumonia in the proper   cl
inical setting.      Signed by: Kandice Spring MD on 2020 8:23 PM        Dic
tated By: KANDICE SPRING MD  Electronically Signed By: KANDICE SPRING MD on   Transcribed By: VALDEMAR on 20       COPY TO:   JERARDO CORDOBA MD         Prothrombin time (PT) in platelet poor plasma by 
coagulation lkixz3611-65-56 18:50:00* 



             Test Item    Value        Reference Range Interpretation Comments

 

             Prothrombin Time (test code = 5902-2) 13.3         11.9-14.5       

           





[facility] HospitalINR in Platelet poor plasma by 
Coagulation fxbol3498-87-10 18:50:00* 



             Test Item    Value        Reference Range Interpretation Comments

 

             Prothromb Time International Ratio (test code = 6301-6) 0.96       

                             





Oral Anticoagulant Therapy INR Values:1. Low Intensity Therapy        1.5 - 2.02
. Moderate Intensity Therapy   2.0 - 3.03. High Intensity Therapy(1)    2.5 - 3.
54. High Intensity Therapy(2)    3.0 - 4.05. Panic Value INR              > 5.0
[facility] HospitalActivated partial thromboplastin time 
(aPTT) in platelet poor plasma by coagulation ptwpt4546-32-01 18:50:00* 



             Test Item    Value        Reference Range Interpretation Comments

 

             Activated Partial Thromboplast Time (test code = 29900-2) 34.2     

    23.8-35.5                  





[facility] HospitalFluoroscopic procedure less than one hour
bxxydvqn5496-91-17 18:50:00* 



             Test Item    Value        Reference Range Interpretation Comments

 

             Lactic Acid Level (test code = Lactic Acid Level) 1.2          0.5-

2.0                    





[facility] HospitalBlood ttlggxr2350-62-20 18:50:00* 



             Test Item    Value        Reference Range Interpretation Comments

 

             Blood Culture (test code = 13427102) NO GROWTH AFTER 72 HOURS      

                      





[facility] HospitalCT [facility]BDOMEN/PELVIS [id number] 17:54:00  
                                                                                
  [address] [address]      Patient Name: SELVIN PATEL            
                   MR #: [id number]                  : 1947            
                      Age/Sex: 72/M  Acct #: [id number]                       
      Req #: [id number]  [facility] Physician:                                         
            Ordered by: Romulo Soriano MD                         Report #:
[id number]        Location: ER                                      Room/Bed:    
              _______________________________________________________
____________________________________________    Procedure: [id number] CT/CT ABDOM
EN/PELVIS WO  Exam Date: 20                            Exam Time: 1655    
                                         REPORT STATUS: Signed    EXAMINATION: 
CT of the abdomen and pelvis without contrast.       TECHNIQUE: Spiral CT images
of the abdomen and pelvis were performed from the   lung bases to the lesser tr
ochanters.  No intravenous contrast was given per   renal stone protocol..  Lydia
nal and sagittal reformatted images were obtained.      COMPARISON: Renal ultras
ound 2020      CLINICAL HISTORY:Abdominal pain, UTI symptoms and hematuria  
        DISCUSSION: ABSENCE OF INTRAVENOUS CONTRAST DECREASES SENSITIVITY FOR D
ETECTION   OF FOCAL LESIONS AND VASCULAR PATHOLOGY.      ABDOMEN/PELVIS:      LO
WER THORAX: Lung bases are grossly clear.       HEPATOBILIARY: Diffuse hepatic s
teatosis. Hepatic size and contour are normal.   No focal lesions.  No intra or 
extrahepatic biliary ductal dilation.      GALLBLADDER: No radio-opaque stones o
r sludge.  No wall thickening.      SPLEEN: No splenomegaly.      PANCREAS: No f
ocal masses or ductal dilatation.      ADRENALS: No adrenal nodules.      KIDNEY
S/URETERS: No renal or ureteral calculi, hydronephrosis or obstruction.   No con
tour abnormalities.   1.6 cm fluid density simple cyst in the left interpolar re
gion (series 3, image   62).   Mild to moderate bilateral perinephric stranding.
  No contour abnormalities.      PELVIC ORGANS/BLADDER: Moderate circumferential
bladder wall thickening.   Prostate measures 4.8 x 3.4 x 3.9 cm (estimated vol
ume 33 mL), and contains   dystrophic calcifications. Seminal vesicles are unrem
arkable.      PERITONEUM/RETROPERITONEUM: No free air or fluid.      LYMPH NODES
: No intra-abdominal,retroperitoneal, pelvic or inguinal   lymphadenopathy.     
VESSELS: Mild atherosclerotic calcification of the distal abdominal aorta.      
GI TRACT: No bowel dilation or evidence of obstruction.      BONES AND SOFT TIS
SUES: No aggressive lytic or suspicious focal sclerotic   lesions. Small bilater
al fat-containing inguinal hernias, left greater than   right. Soft tissues are 
otherwise unremarkable      IMPRESSION:          1. Moderate circumferential sim
dder wall thickening. This may be secondary to   bladder outlet obstruction from
a mildly enlarged prostate, however, cystitis   is also a consideration.      2.
Mild to moderate bilateral perinephric stranding. No renal, ureteral or   blad
mary calculi. No hydronephrosis or obstruction. Unable to evaluate for   pyelonep
hritis given the lack of intravenous contrast.      3. Diffuse hepatic steatosis
.      Signed by: Dr. Rober Hastings M.D. on 2020 6:08 PM        Dictat
ed By: ROBER HASTINGS MD  Electronically Signed By: ROBER HASTINGS MD on 
0 180  Transcribed By: VALDEMAR on 20       COPY TO:   MARY SORIANO MD         CHEST SINGLE (PORTABLE)2020 16:12:00                      
                                                               [place] [address]      Patient Name: SELVIN PATEL            
                   MR #: [id number]                  : 1947            
                      Age/Sex: 72/M  Acct #: [id number]                       
      Req #: [id number]  Adm Physician:                                         
            Ordered by: Romulo Soriano MD                         Report #:
[id number]        Location: ER                                      Room/Bed:    
              _______________________________________________________
____________________________________________    Procedure: [id number] DX/CHEST SI
NGLE (PORTABLE)  Exam Date: 20                            Exam Time: 1549 
                                            REPORT STATUS: Signed    EXAMINATIO
N:  CHEST SINGLE (PORTABLE)          INDICATION: Fever      COMPARISON: Chest ra
diograph 3/5/2019           FINDINGS:      LINES/TUBES:None      LUNGS:The lungs
are well-inflated. No focal consolidation or pulmonary edema.      PLEURA:No pl
eural effusion or pneumothorax.      MEDIASTINUM:The cardiomediastinal silhouett
e appears normal in size and shape.      BONES/SOFT TISSUES:No acute osseous inj
ury. Right proximal humerus anchors.      ABDOMEN:No free air under the diaphrag
m.         IMPRESSION:    No focal pneumonia or pulmonary edema.      Signed by:
Mg Billingsley MD on 2020 4:13 PM        Dictated By: MG BILLINGSLEY MD  Electron
ically Signed By: MG BILLINGSLEY MD on 20  Transcribed By: VALDEMAR on  1613       COPY TO:   ROMULO SORIANO MD         US RENAL 
RETROPERITONEAL OVAS9499-00-64 10:06:00                                         
                                            [place] [address]   
  Patient Name: SELVIN PATEL                                MR #: [id number]   
              : 1947                                   Age/Sex: 72/M  
Acct #: [id number]                              Req #: [id number]  Adm 
Physician:                                                      Ordered by: 
GIOVANNI WHITE MD                         Report #: [id number]        Location: 
                                      Room/Bed:                   
___________________________________________________________
________________________________________    Procedure: [id number] US/US RENAL RET
ROPERITONEAL COMP  Exam Date: 20                            Exam Time: 082
3                                              REPORT STATUS: Signed    EXAM: Re
nal Ultrasound   INDICATION:            [id number]    0823    NEOPLASM OF UNCERTAI
N OF UNSPC'D KIDNEY     COMPARISON: CT abdomen and pelvis of 3/11/2019    TECHNI
QUE: Transverse and longitudinal images of the kidneys and bladder were   obtain
ed.        FINDINGS:           Right Kidney:    Length: 12.7 cm   Appearance: No
rmal echogenicity.     Collecting system: No hydronephrosis   Stones: None   Cys
t/Mass: None      Left Kidney:    Length: 12.3 cm   Appearance: Normal echogenic
ity.     Collecting system: No hydronephrosis   Stones: None   Cyst/Mass: Upper 
pole 1.4 cm anechoic simple cyst.      Bladder:    No mass or calculi. Bilateral
ureteral jets visualized. Prevoid volume estimate   of 348 cc.      Prostate vo
lume estimate of 40.2 cc.      IMPRESSION:   Left upper pole simple cyst.      N
o hydronephrosis or renal calculi.      Signed by: Mg Billingsley MD on 2020 10
:08 AM        Dictated By: MG BILLINGSLEY MD  Electronically Signed By: MG BONILLA on 20  Transcribed By: VALDEMAR on 20       COPY TO:   GIOVANNI HAAS MD         CHEM GDPST0768-64-06 14:19:000.9[facility] 
[id number] 14:19:0085[facility]annABDOMEN-1[facility] (KUB)2019-10-10 10:08:00
                                                                                
    [place] [address]      Patient Name: SELVIN PATEL       
                           MR #: [id number]                     : 1947 
                                 Age/Sex: 71/M  Acct #: [id number]            
                 Req #: [id number]  Adm Physician:                              
                       Ordered by: GIOVANNI WHITE MD                           
Report #: [id number]        Location: [facility]                                     
Room/Bed:                     ________________________________________________
___________________________________________________    Procedure: [id number] DX/A
BDOMEN-1VIEW (KUB)  Exam Date: 10/10/19                            Exam Time: 09
10                                              REPORT STATUS: Signed       Abdo
men, 1 view.         History: Kidney stones.      Comparison: 2019.      Fi
ndings: Air is scattered throughout nondilated small and large bowel. There   ar
e no masses. The previously seen small calcification projected over the right   
renal lower pole is not seen on today's exam. The osseous structures are   intac
t.         IMPRESSION:   Non-specific bowel gas pattern.      Signed by: Andrey Barone on 10/10/2019 10:10 AM        Dictated By: ANDREY BARONE MD  Electronical
ly Signed By: ANDREY BARONE MD on 10/10/19 1010  Transcribed By: VALDEMAR on 10/1
0/19 1010       COPY TO:   GIOVANNI WHITE MD         LACTIC ACID [id number] 
12:50:00* 



             Test Item    Value        Reference Range Interpretation Comments

 

             LACTIC ACID POC (test code = LACTP) 2.20 mmol/L  0.7-2.0      HH   

         





[id number] 11:01:00* 



             Test Item    Value        Reference Range Interpretation Comments

 

             POTASSIUM (test code = K) 5.0 mmol/L   3.4-5.0      N             





HGB   [id number] 10:53:00* 



             Test Item    Value        Reference Range Interpretation Comments

 

             HEMOGLOBIN (test code = HGB) 11.8 g/dL    14.0-18.0    L           

  

 

             HEMATOCRIT (test code = HCT) 37.1 %       37.0-49.0    N           

  





- CT ABD PELVIS W/[id number] 10:12:00 FAX:        Beulah Walton MD       
[phone]    [place]:   St: REG----------
---------------------------------------------------------------------  Name:  SELVIN SIBLEY                     [facility]                     : 11/10/194
7   Age/S: 71/M           [address]                  Unit: [id number]      
Loc: [place] [address]                Phys:  Beulah Walton MD     
                                              Acct:  [id number] Dis Date:     
         Status: REG ER                                  PHONE #: [phone]  
  Exam Date: 2019 1000                        FAX #: [phone]     
Reason: llq pain                                            EXAMS:              
                                CPT CODE:      [id number] CT ABD PELVIS W/CONT   
                   [id number]                    EXAM:  - CT ABD PELVIS W/CONT       
       LOCATION: C3               INDICATION: 71 years -old Male with llq pain  
            TECHNIQUE: Contrast -        IV contrast was given. No oral contrast
was given        Portal venous phase - abdomen and pelvis       Delayed images 
through the abdomen.       Reconstructions - coronal and sagittal planes        
      This exam was performed according to our departmental       dose-opti
mization program, which includes automated exposure control,       adjustment of
the mA and/or kV according to patient size and/or use of       iterative recons
truction technique               COMPARISON: None               FINDINGS:       
Statements: None.               Thoracic: Included images of the lower chest dem
onstrate no       abnormalities.               Hepatobiliary: Liver demonstrates
diffuse decrease in attenuation       without focal lesion. The gallbladder is 
normal. No biliary dilation.               Pancreas: Normal.               Splee
n: Normal.                Adrenals: Normal.               Genitourinary: 1.2 cm 
left renal cyst noted.  No hydronephrosis.        Evaluation of the bladder is l
imited, but no obvious bladder       abnormality is present.               Gastr
ointestinal: Mild inflammation identified at the proximal aspect       of the si
gmoid in this patient with diverticula. The appendix is not       visualized.   
           Vascular: Atherosclerotic calcifications are seen within the aorta a
nd       branch vessels.      PAGE  1                       Signed Report       
            (CONTINUED)  FAX:        Beulah Walton MD       [phone]    [place]
us:   St: REG-----------------------------------------------------------------
--------------  Name:  SELVIN PATEL                     [facility]          
          : 1947   Age/S: 71/M           [address]                
 Unit: [id number]       Loc: [place] [address]                P
hys:  Beulah Walton MD                                                    Acct:  
[id number] Dis Date:               Status: REG ER                             
    PHONE #: [phone]     Exam Date: 2019 1000                        
FAX #: [phone]     Reason: llq pain                                        
   EXAMS:                                               CPT CODE:      [id number]
CT ABD PELVIS W/CONT                       [address]               <Continued>      
         Lymphatics: No enlarged lymph nodes by CT size criteria.               
Bones/Soft Tissues: No acute osseous findings. No ventral hernias.              
Peritoneum/Other: No extraluminal air. No extraluminal fluid.                 
IMPRESSION:                   Uncomplicated mild sigmoid diverticulitis.        
          Hepatic steatosis.          ** Electronically Signed by Yuval Patterson MD on 2019 at 1012 **                      Reported and signed by: Yuval Patterson MD                         CC: Beulah Walton MD                           
                                                                                
             Technologist: MANSOOR Gaines                          
 Trnscrd Dt/Tm: 2019 (1012) t.SDR.HV2                          Orig Print 
D/T: S: 2019 (1015                              PAGE  2                   
   Signed Report                               BASIC METABOLIC [id number] 
09:57:00* 



             Test Item    Value        Reference Range Interpretation Comments

 

             SODIUM (test code = NA) 134 mmol/L   137-145      L             

 

             POTASSIUM (test code = K) 5.6 mmol/L   3.4-5.0      H            IS

 THE SAMPLE 

HEMOLYZED?:NHEMOLYSIS GRADE:

 

             CHLORIDE (test code = CL) 98 mmol/L           N             

 

             CARBON DIOXIDE (test code = CO2) 22 mmol/L    22-30        N       

      

 

             GLUCOSE (test code = GLU) 331 mg/dL           H             

 

             BLOOD UREA NITROGEN (test code = BUN) 26 mg/dL     9-20         H  

           

 

             GLOMERULAR FILTRATION RATE (test code = GFR) 63           >60      

                 The estimated glomerular 

filtration rate is computed usingpatient race, age (>18), sex, and serum 
creatinine. If anyof the needed data elements are missing the Laboratory cannot 
compute an estimation of the glomerular filtration rate.

 

             CREATININE (test code = CREAT) 1.2 mg/dL    0.7-1.3      N         

    

 

             CALCIUM (test code = CA) 8.8 mg/dL    8.4-10.2     N             





LIVER FUNCTION [id number] 09:57:00* 



             Test Item    Value        Reference Range Interpretation Comments

 

             TOTAL PROTEIN (test code = PROT) 6.8 g/dL     6.3-8.2      N       

      

 

             ALBUMIN (test code = ALB) 4.1 g/dL     3.5-5.0      N             

 

             BILIRUBIN TOTAL (test code = BILT) 0.3 mg/dL    0.2-1.3      N     

        

 

             BILIRUBIN CONJUGATED (test code = BILCON) 0 mg/dL      0-0.3       

 N            

~~~~~~~~~~~~~~~~~~~~~~~~~~~~~~~~~~~~~~~~~~~~~~~~~~~~~~~~~~~~CONJUGATED BILIRUBIN
IS THE REPLACEMENT ASSAY FOR 
DIRECTBILIRUBIN.~~~~~~~~~~~~~~~~~~~~~~~~~~~~~~~~~~~~~~~~~~~~~~~~~~~~~~~~~~~~

 

             BILIRUBIN UNCONJUGATED (test code = BILUNC) 0.2 mg/dL    0-1.1     

   N             

 

             SGOT/AST (test code = AST) 17 U/L       15-46        N             

 

             SGPT/ALT (test code = ALT) 19 U/L       13-69        N             

 

             ALKALINE PHOSPHATASE (test code = ALKP) 38 U/L              N

             





[id number] 09:57:00* 



             Test Item    Value        Reference Range Interpretation Comments

 

             LIPASE (test code = LIP) 255 U/L             N             





BEDSIDE [id number] 09:53:00* 



             Test Item    Value        Reference Range Interpretation Comments

 

             BEDSIDE CREATININE (test code = CREATBED) 1.2 mg/dL    0.66-1.25   

 N             





PROTHROMBIN [id number] 09:52:00* 



             Test Item    Value        Reference Range Interpretation Comments

 

             PROTHROMBIN TIME PATIENT (test code = PTP) 12.2 SECONDS 9.2-12.1   

  H             

 

             INTERNATIONAL NORMAL RATIO (test code = INR) 1.1                   

                 The INR is to be used only 

for monitoring ORAL ANTICOAGULANTTHERAPY.         Indication                    
            INR Value1.  Prophylaxis/treatment of:                            
Venous Thrombosis, Pulmonary Embolism        2.0 - 3.02.  Prevention of systemic
embolism from:      Tissue heart valves                          2.0 - 3.0      
Acute myocardial infarction (to present      systemic embolism)*                
         2.0 - 3.0      Valvular heart disease                       2.0 - 3.0  
   Atrial fibrillation                          2.0 - 3.03.  Mechanical 
prosthetic valves (high risk)       2.5 - 3.5 * If oral anticoagulant therapy is
elected to preventrecurrent myocardial infarction, an INR of 2.5-3.5 
isrecommended, consistent with Food and Drug Administrationrecommendations.





THROMBOPLASTIN TIME [id number] 09:52:00* 



             Test Item    Value        Reference Range Interpretation Comments

 

             THROMBOPLASTIN TIME PARTIAL (test code = PTT) 22.4 SECONDS 23.4-37.

0    L              

Therapeutic Range for Heparin EFFECTIVE 7/10/13 Heparin IU/mL                   
aPTT Seconds0.3                              64.30.7                            
 88.8





CBC W/AUTO [id number] 09:44:00* 



             Test Item    Value        Reference Range Interpretation Comments

 

             WHITE BLOOD CELL (test code = WBC) 9.5 x10 3/uL 5.0-12.0     N     

        

 

             RED BLOOD CELL (test code = RBC) 5.09 x10 6/uL 4.70-6.10    N      

       

 

             HEMOGLOBIN (test code = HGB) 12.4 g/dL    14.0-18.0    L           

  

 

             HEMATOCRIT (test code = HCT) 40.0 %       37.0-49.0    N           

  

 

             MEAN CELL VOLUME (test code = MCV) 79 fL        80-94        L     

        

 

             MEAN CELL HGB (test code = MCH) 24.4 pg      27-31        L        

     

 

             MEAN CELL HGB CONCENTRATION (test code = MCHC) 31.0 g/dL    33-37  

      L             

 

             RED CELL DISTRIBUTION WIDTH (test code = RDW) 14.8 %       11.5-15.

5    N             

 

             PLATELET COUNT (test code = PLT) 288 x10 3/uL 130-400      N       

      

 

             MEAN PLATELET VOLUME (test code = MPV) 10.4 fL      9.4-16.4     N 

            

 

             NEUTROPHIL % (test code = NT%) 73.4 %       43-65        H         

    

 

             IMMATURE GRANULOCYTE % (test code = IG%) 0.6 %        0.0-2.0      

N             

 

             LYMPHOCYTE % (test code = LY%) 19.3 %       20.5-45.5    L         

    

 

             MONOCYTE % (test code = MO%) 4.7 %        5.5-11.7     L           

  

 

             EOSINOPHIL % (test code = EO%) 1.8 %        0.9-2.9      N         

    

 

             BASOPHIL % (test code = BA%) 0.2 %        0.2-1.0      N           

  

 

             NUCLEATED RBC % (test code = NRBC%) 0.0 %        0-1.0        N    

         

 

             NEUTROPHIL # (test code = NT#) 6.96 x10 3/uL 2.2-4.8      H        

     

 

             IMMATURE GRANULOCYTE # (test code = IG#) 0.06 x10 3/uL 0-0.03      

 H             

 

             LYMPHOCYTE # (test code = LY#) 1.83 x10 3/uL 1.3-2.9      N        

     

 

             MONOCYTE # (test code = MO#) 0.45 x10 3/uL 0.3-0.8      N          

   

 

             EOSINOPHIL # (test code = EO#) 0.17 x10 3/uL 0.0-0.2      N        

     

 

             BASOPHIL # (test code = BA#) 0.02 x10 3/uL 0.0-0.1      N          

   





ABDOMEN-1VIEW (KUB)2019 09:21:00                                          
                                           [place] [address]    
 Patient Name: SELVIN PATEL                                   MR #: 
[id number]                     : 1947                                  
Age/Sex: 71/M  Acct #: [id number]                              Req #: [id number]
[id number]  Adm Physician:                                                      
Ordered by: GIOVANNI WHITE MD                            Report #: [id number]   
    Location: [facility]                                     Room/Bed:                 
   ______________________________________________
_____________________________________________________    Procedure: [id number] DX
/ABDOMEN-1VIEW (KUB)  Exam Date: 19                            Exam Time: 
0850                                              REPORT STATUS: Signed    Exam:
KUB - 2 views      Clinical History: Renal calculus.      Comparison: CT abdome
n/pelvis 3/11/2019.      Findings:    There is a 5 mm calcification overlying th
e right lower pole kidney. No   evidence of calcification overlying the left kid
karla or expected course of the   ureters. There are prostatic calcifications.    
 Nonobstructive bowel gas pattern. Moderate amount of stool in the colon.      
No acute bony abnormality.      Impression:   A 5 mm calcification overlying the
right lower pole kidney, corresponding to   stone seen on prior CT from 3/11/20
19.      Signed by: Dr. Ruma Méndez MD on 2019 9:25 AM        Dictated By: 
RUMA MÉNDEZ MD  Electronically Signed By: RUMA MÉNDEZ MD on 19  Transcr
ibed By: VALDEMAR on 19       COPY TO:   GIOVANNI WHITE MD         CT 
ABDOMEN/PELVIS [id number] 10:12:00                                           
                                          [place] [address]     
Patient Name: SELVIN PATEL                                   MR #: [id number]
                    : 1947                                   Age/Sex: 
71/M  Acct #: [id number]                              Req #: [id number]  Adm 
Physician:                                                      Ordered by: 
GIOVANNI WHITE MD                            Report #: [id number]        
Location: CT                                      Room/Bed:                     
______________________________________________
_____________________________________________________    Procedure: [id number] CT
/CT ABDOMEN/PELVIS WOW  Exam Date: 19                            Exam Time
: 0850                                              REPORT STATUS: Signed    EXA
M: CT Abdomen and Pelvis WITHOUT and WITH contrast     INDICATION: Renal neoplas
m   COMPARISON: CT abdomen/pelvis, 2019   TECHNIQUE:  Abdomen and pelvis we
re scanned utilizing a multidetector helical   scanner from the lung base to the
pubic symphysis before and after   administration of IV contrast. Coronal and s
agittal reformations were obtained.   Imaging was obtained precontrast, arterial
phase, venous phase and delayed   phase. Renal mass protocol was performed, with
abdomen and pelvis scanning on   delayed imaging.      Dose modulation, iterat
jonathan reconstruction, and/or weight based adjustment of   the mA/kV was utilized t
o reduce the radiation dose to as low as reasonably   achievable.               
   IV CONTRAST: 100 mL of Isovue-370               ORAL CONTRAST: 900 cc water  
            RADIATION DOSE: Total DLP: 1803.90 mGy*cm                Estimated 
effective dose: (DLP x 0.015 x size factor) mSv               COMPLICATIONS: No
ne      FINDINGS:      LINES and TUBES: None.      LOWER THORAX:  Lung bases collette
ar. Heart size normal.      HEPATOBILIARY: Diffuse low density hepatic parenchym
a compatible with   steatosis.  No focal hepatic lesions. No biliary ductal dila
tion.       GALLBLADDER: No radio-opaque stones or sludge.  No wall thickening. 
    SPLEEN: No splenomegaly.       PANCREAS: No focal masses or ductal dilatati
on.        ADRENALS: No adrenal nodules          KIDNEYS/URETERS: Kidneys enhanc
e symmetrically.  No hydronephrosis or ureteral   dilatation no filling defects 
in opacified collecting systems. There is a well   marginated 1.1 cm peripheral 
low density mass in the lateral upper aspect of   the left kidney. This is best 
seen on postcontrast imaging. The internal   density measures under 10 HU on eac
h phase of the examination with no evidence   for enhancement.  Again noted is a
5 mm calcification in the lower pole right   collecting system compatible with 
nonobstructing intrarenal calculus. There is   very mild perinephric stranding b
ilaterally.      GI TRACT: No abnormal distention, wall thickening, or evidence 
of bowel   obstruction.  There are scattered colonic diverticula with no CT evid
ence for   acute diverticulitis.  The appendix is not conspicuously visualized b
ut there   is no right lower quadrant inflammation to suggest appendicitis.     
PELVIC ORGANS/BLADDER: Partially opacified urinary bladder unremarkable. No   d
iscrete abnormal mass or fluid collection in the pelvis.      LYMPH NODES: No do
minant lymph node mass is seen in the abdomen,   retroperitoneum or pelvis.     
VESSELS: There are scattered calcified plaques along the abdominal aorta. No   
abdominal aortic aneurysm or dissection. Renal arteries are patent with an   acc
essory left renal artery noted.  IVC unremarkable. Portal system is patent,   on
ly partially included on the venous phase which is targeted at the kidneys.     
PERITONEUM / RETROPERITONEUM: No pneumoperitoneum or ascites.      BONES: No ac
jose or suspicious bony lesions.      SOFT TISSUES: Superficial surrounding soft 
tissue unremarkable.   Small left   inguinal hernia containing fat.            I
MPRESSION:    1.  1.1 cm hypodensity in the lateral upper pole of the left kidne
y is cyst   density, well marginated and shows no evidence for enhancement. This
is   compatible with a small cortical cyst.      2.  Again noted is a 5 mm nono
bstructing intrarenal calculus the lower pole of   the right collecting system. 
    3.  No other acute finding or significant change from previous CT.      Sig
mary by: Dr. Robert Nolasco M.D. on 3/11/2019 10:37 AM        Dictated By: ERICK NOLASCO MD  Electronically Signed By: ROBERT NOLASCO MD on 19 1037  Transcr
ibed By: VALDEMAR on 19 1037       COPY TO:   GIOVANNI WHITE MD          
[id number] 17:21:00                                                      
                               [place] [address]      Patient 
Name: SELVIN PATEL                                   MR #: [id number]        
            : 1947                                   Age/Sex: 71/M  
Acct #: [id number]                              Req #: [id number]  Adm 
Physician:                                                      Ordered by: 
KARLEE BANUELOS M.D.                            Report #: [id number]        
Location:                                       Room/Bed:                     
___________________________________________
________________________________________________________    Procedure: [id number]
US/US THYROID  Exam Date: 19                            Exam Time: 1348   
                                          REPORT STATUS: Signed    EXAM: Thyroid
Ultrasound   INDICATION:         THYROID CANCER. Thyroidectomy .    JATINDER
RISON: None    TECHNIQUE: Transverse and sagittal images were obtained of the th
yroid bed.       FINDINGS:      Thyroid bed:   Status post thyroidectomy. No res
idual thyroid tissue or recurrent thyroid   tissue is noted in the thyroid bed b
ilaterally.        Lymph nodes:   No suspicious appearing lymph nodes.        IM
PRESSION:      Status post thyroidectomy. No evidence to suggest residual or rec
urrent   disease.      Signed by: Dr. Joselito Dunlap M.D. on 3/5/2019 5:22 PM        D
ictated By: JOSELITO DUNLAP MD  Electronically Signed By: JOSELITO DUNLAP MD on 19  T
ranscribed By: VALDEMAR on 19 172       COPY TO:   KARLEE BANUELOS M.D.   
     CHEST 2 [id number] 15:13:00                                           
                                          [address], [place], [place] [address]     
Patient Name: SELVIN PATEL                                   MR #: [id number]
                    : 1947                                   Age/Sex: 
71/M  Acct #: [id number]                              Req #: [id number]  Adm 
Physician:                                                      Ordered by: 
KARLEE BANUELOS M.D.                            Report #: [id number]        
Location:                                       Room/Bed:                     
___________________________________________
________________________________________________________    Procedure: [id number]
DX/CHEST 2 VIEWS  Exam Date: 19                            Exam Time: 1340
                                             REPORT STATUS: Signed       EXAMI
NATION: PA and lateral views of the chest.      COMPARISON: None      CLINICAL H
ISTORY:  Thyroid cancer           DISCUSSION:      Lines/tubes:  None.      Lung
s:  The lungs are well inflated and clear. No pneumonia or pulmonary edema.     
Pleura:  No pleural effusion or pneumothorax.      Heart and mediastinum:  The 
cardiomediastinal silhouette is normal.      Bones and soft tissues:  No acute b
damari abnormalities.           IMPRESSION:       No acute cardiopulmonary abnormal
ities.                  Signed by: Dr. Andrew Palisch, M.D. on 3/5/2019 3:14 PM 
      Dictated By: ANDREW R PALISCH MD  Electronically Signed By: CHI ÁLVAREZ MD on 19 1514  Transcribed By: VALDEMAR on 19 1514       COPY TO:
  KARLEE BANUELOS M.D.         CT ABDOMEN/PELVIS [id number] 10:30:00          
                                                                           [place] [address]      Patient Name: SELVIN PATEL          
                        MR #: [id number]                     : 1947    
                              Age/Sex: 71/M  Acct #: [id number]               
              Req #: [id number]  Adm Physician:                                 
                    Ordered by: GIOVANNI WHITE MD                            
Report #: [id number]        Location: CT                                      
Room/Bed:                     ______________________________________________
_____________________________________________________    Procedure: [id number] CT
/CT ABDOMEN/PELVIS WO  Exam Date: 19                            Exam Time:
0910                                              REPORT STATUS: Signed    EXAM:
CT ABDOMEN AND PELVIS without IV CONTRAST   DATE: 2019 Time stamp on Exam: 
9:37 AM   INDICATION:  Hematuria   COMPARISON:  None    TECHNIQUE: The abdomen 
and pelvis were scanned using a multidetector helical   scanner. Coronal and sa
gittal reformations were obtained. Routine protocol   performed.      Low-dose t
echnique was utilized.      IV Contrast: None   Oral Contrast: None   Radiation 
Dose: Total .08 mGy*cm   Estimated effective dose: DLP x 0.015 x size fac
tor      FINDINGS:   LOWER THORAX: No consolidations      LIVER: No masses with 
diffuse fatty infiltration of the liver.   BILIARY: The gallbladder is unremarka
ble.  No ductal dilatation.      SPLEEN: No masses   PANCREAS: No masses      AD
RENALS: No nodules   KIDNEYS: There are 2 small right lower pole renal stones wi
th a composite   measurement of 6 mm. No evidence of hydronephrosis. Contour abn
ormality of the   left upper interpolar region with subtle low density may repre
sent a cyst or a   mass. In a patient with gross hematuria CT renal mass protoco
l follow-up would   be of benefit. Nonspecific bilateral perinephric fat strandi
ng.      GI TRACT: No distention, wall thickening or evidence of obstruction.   
      VESSELS: Vascular calcification.   PERITONEUM/RETROPERITONEUM: No free air
or fluid   LYMPH NODES: No lymphadenopathy      REPRODUCTIVE ORGANS: Prostate 
calcification.   BLADDER: Unremarkable      SOFT TISSUES: Unremarkable   BONES: 
No suspicious bone lesions. Mild degenerative changes of the spine.      IMPRESS
ION:   1. Two small nonobstructing right lower pole renal stones.   2. Subtle le
ft lateral renal interpolar contour abnormality with focal   hypodensity.   3. F
ollow-up renal mass protocol is recommended.   4. Diffuse fatty infiltration of 
the liver.      Signed by: Dr. Jose Maria Meza DO on 2019 10:43 AM        Di
ctated By: JOSE MARIA MEZA DO  Electronically Signed By: JOSE MARIA MEZA DO on  1043  Transcribed By: VALDEMAR on 19       COPY TO:   RAVINDRA WHITE RD, MD         ABDOMEN-1VIEW (KUB)2018 09:41:00                            
                                                         [place] [address]      Patient Name: SELVIN PATEL                        
          MR #: [id number]                     : 1947                  
                Age/Sex: 71/M  Acct #: [id number]                             
Req #: [id number]  Adm Physician:                                               
      Ordered by: GIOVANNI WHITE MD                            Report #: [id number]
[id number]        Location: [facility]                                     Room/Bed:         
           ______________________________________________
_____________________________________________________    Procedure: [id number] DX
/ABDOMEN-1VIEW (KUB)  Exam Date: 18                            Exam Time: 
0852                                              REPORT STATUS: Signed    PROCE
DURE:   X-RAY ABDOMEN - KUB       COMPARISON:   KUB 18.       INDICATIONS: 
 CALCULUS OF KIDNEY       FINDINGS:      Again noted are right lower pole renal 
stones measuring up to 5 mm. No    evidence of calcifications overlying the left
kidney or the expected    course of bilateral ureters.        There is a non-o
bstructed bowel-gas pattern. There are no acute osseous    abnormalities. The ramos
ng bases are clear.       CONCLUSION:      Similar appearance of right lower gwendolyn
e renal stones measuring up to 5    mm.       Dictated by:  RUMA MÉNDEZ M.D. on 
2018 at 9:41        Electronically approved by:  RUMA MÉNDEZ M.D. on  at 9:41                Dictated By: RUMA MÉNDEZ MD  Electronically Signed B
y: RUMA MÉNDEZ MD on 18  Transcribed By: TIFFANI on 18       
COPY TO:   GIOVANNI WHITE MD        ABDOMEN-1VIEW (KUB)2018 12:18:00    [place] [address]      Patient Name: SELVIN PATEL   MR #: [id number]    : 
1947 Age/Sex: 70/M  Acct #: [id number] Req #: [id number]  Adm Physician:
    Ordered by: GIOVANNI WHITE MD  Report #: [id number]   Location: [facility]
ed:     ________________________________________________________________________
___________________________    Procedure: [id number] DX/ABDOMEN-1VIEW (KUB)  Exam
Date: 18                            Exam Time:

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
